# Patient Record
Sex: MALE | Race: BLACK OR AFRICAN AMERICAN | NOT HISPANIC OR LATINO | Employment: FULL TIME | URBAN - METROPOLITAN AREA
[De-identification: names, ages, dates, MRNs, and addresses within clinical notes are randomized per-mention and may not be internally consistent; named-entity substitution may affect disease eponyms.]

---

## 2017-01-17 ENCOUNTER — TELEPHONE (OUTPATIENT)
Dept: FAMILY MEDICINE | Facility: CLINIC | Age: 37
End: 2017-01-17

## 2017-01-17 NOTE — TELEPHONE ENCOUNTER
Alerted by pharmacy  that the medication Wellbutrin XL is not covered.  Reason:  not on formulary   PA phone number is [phone] and the pt ID# is [id number].  Have we done a PA for this med in previous years? YES - Date:   3-7-16 for SR Brand  7-7-14 for Generic XL  5-7-14 for Generic regular formulation    Insurance faxing form.ELVIA Shields, CMA

## 2017-01-18 DIAGNOSIS — M54.50 CHRONIC MIDLINE LOW BACK PAIN WITHOUT SCIATICA: ICD-10-CM

## 2017-01-18 DIAGNOSIS — G89.29 CHRONIC MIDLINE LOW BACK PAIN WITHOUT SCIATICA: ICD-10-CM

## 2017-01-19 PROBLEM — M54.50 CHRONIC MIDLINE LOW BACK PAIN WITHOUT SCIATICA: Status: ACTIVE | Noted: 2017-01-19

## 2017-01-19 PROBLEM — G89.29 CHRONIC MIDLINE LOW BACK PAIN WITHOUT SCIATICA: Status: ACTIVE | Noted: 2017-01-19

## 2017-01-19 RX ORDER — CYCLOBENZAPRINE HCL 10 MG
TABLET ORAL
Qty: 60 TABLET | Refills: 3 | Status: SHIPPED | OUTPATIENT
Start: 2017-01-19 | End: 2022-08-23

## 2017-01-19 NOTE — TELEPHONE ENCOUNTER
Routing refill request to provider for review/approval because:  Drug not on the [facility] refill protocol   Kenya MALLOY RN    Pending Prescriptions:                       Disp   Refills    cyclobenzaprine (FLEXERIL) 10 MG tablet [P*30 tab*0        Sig: TAKE ONE TABLET BY MOUTH NIGHTLY AS NEEDED FOR MUSCLE           SPASMS        Last Written Prescription Date:  1/11/2016  Last Fill Quantity: 30,   # refills: 5  Last Office Visit with [facility] or [facility] prescribing provider: 12/20/2016  Future Office visit:       Routing refill request to provider for review/approval because:  Drug not on the [facility], [facility] or  [place] refill protocol or controlled substance

## 2017-01-20 ENCOUNTER — TELEPHONE (OUTPATIENT)
Dept: FAMILY MEDICINE | Facility: CLINIC | Age: 37
End: 2017-01-20

## 2017-01-20 NOTE — TELEPHONE ENCOUNTER
"Harris,  [facility] calling requesting a verbal order for needles for pt's testosterone injs.  She says he is requesting needles 25G, 1 and 1/2\", 3ml needles.  I gave verbal for these needles 25G, 1 and 1/2\", 3ml needles #30 R 0 per her request.   If not ok let me know.  Thanks,  Nicole Conte RN    "

## 2017-01-27 ENCOUNTER — PRE VISIT (OUTPATIENT)
Dept: UROLOGY | Facility: CLINIC | Age: 37
End: 2017-01-27

## 2017-01-27 NOTE — TELEPHONE ENCOUNTER
Left message with patient to see if semen analysis will be completed  Prior to seeing Dr Goldsmith for TRUS. Patient has lab visit today for Dr Goldsmith labs. Instructed patient to call clinic to let us know

## 2017-01-30 ENCOUNTER — PRE VISIT (OUTPATIENT)
Dept: UROLOGY | Facility: CLINIC | Age: 37
End: 2017-01-30

## 2017-01-30 NOTE — TELEPHONE ENCOUNTER
Left message with patient to see if semen analysis has been completed and to see if labs will be completed prior to seeing Dr Goldsmith for his upcoming TRUS on 2/3/17. If SA or labs will not be completed then office visit should be rescheduled. Gave andrology number to patient and instructed patient to call clinic. Patient no showed to lab visit on 1/27/17

## 2017-02-03 ENCOUNTER — TELEPHONE (OUTPATIENT)
Dept: FAMILY MEDICINE | Facility: CLINIC | Age: 37
End: 2017-02-03

## 2017-02-03 NOTE — TELEPHONE ENCOUNTER
Try for PA. If there is another injectable testosterone that is covered we could sub that but needs injectable as they are attempting pregnancy and we need to avoid sin contact with her in early pregnancy

## 2017-02-03 NOTE — TELEPHONE ENCOUNTER
Alerted by pharmacy  that the medication Testostrone is not covered.  Reason:  not on formulary   PA phone number is [phone] and the pt ID# is [id number].  Have we done a PA for this med in previous years? NO    Would you like to do prior auth or send alternate medication?  Any additional reasoning or failed meds?

## 2017-02-07 ENCOUNTER — TELEPHONE (OUTPATIENT)
Dept: UROLOGY | Facility: CLINIC | Age: 37
End: 2017-02-07

## 2017-02-15 DIAGNOSIS — E29.1 TESTICULAR HYPOFUNCTION: ICD-10-CM

## 2017-02-15 DIAGNOSIS — E29.1 HYPOGONADISM MALE: ICD-10-CM

## 2017-02-15 RX ORDER — CLOMIPHENE CITRATE 50 MG/1
25 TABLET ORAL DAILY
Qty: 15 TABLET | Refills: 5 | Status: SHIPPED | OUTPATIENT
Start: 2017-02-15 | End: 2022-08-23

## 2017-02-20 DIAGNOSIS — E29.1 HYPOGONADISM MALE: ICD-10-CM

## 2017-02-20 DIAGNOSIS — E29.1 TESTICULAR HYPOFUNCTION: ICD-10-CM

## 2017-02-20 LAB
ABNORMAL SPERM: ABNORMAL MORPHOLOGY
ABSTINENCE DAYS: 4 DAYS (ref 2–7)
ABSTINENCE DAYS: 4 DAYS (ref 2–7)
AGGLUTINATION: ABNORMAL YES/NO
AGGLUTINATION: ABNORMAL YES/NO
ANALYSIS TEMP - CENTIGRADE: 22 CENTIGRADE
ANALYSIS TEMP - CENTIGRADE: 22 CENTIGRADE
CELL FRAGMENTS: ABNORMAL %
CELL FRAGMENTS: ABNORMAL %
COLLECTION METHOD: ABNORMAL
COLLECTION METHOD: ABNORMAL
COLLECTION SITE: ABNORMAL
COLLECTION SITE: ABNORMAL
CONSENT TO RELEASE TO PARTNER: YES
CONSENT TO RELEASE TO PARTNER: YES
HEAD DEFECT: ABNORMAL
IMMATURE SPERM: ABNORMAL %
IMMATURE SPERM: ABNORMAL %
IMMOTILE: 100 %
IMMOTILE: ABNORMAL %
LAB RECEIPT TIME: ABNORMAL
LAB RECEIPT TIME: ABNORMAL
LIQUEFIED: ABNORMAL YES/NO
MIDPIECE DEFECT: ABNORMAL
NON-PROGRESSIVE MOTILITY: 0 %
NON-PROGRESSIVE MOTILITY: ABNORMAL %
NORMAL SPERM: ABNORMAL % NORMAL FORMS (ref 4–?)
PROGRESSIVE MOTILITY: 0 % (ref 32–?)
PROGRESSIVE MOTILITY: ABNORMAL % (ref 32–?)
ROUND CELLS: 0.7 MILLION/ML (ref ?–2)
ROUND CELLS: <0.1 MILLION/ML (ref ?–2)
SPECIMEN CONCENTRATION: 0 MILLION/ML (ref 15–?)
SPECIMEN CONCENTRATION: ABNORMAL MILLION/ML (ref 15–?)
SPECIMEN PH: 6.4 PH (ref 7.2–?)
SPECIMEN PH: 8 PH (ref 7.2–?)
SPECIMEN TYPE: ABNORMAL
SPECIMEN TYPE: ABNORMAL
SPECIMEN VOL UR: 1 ML (ref 1.5–?)
SPECIMEN VOL UR: 3 ML (ref 1.5–?)
TAIL DEFECT: ABNORMAL
TIME OF ANALYSIS: ABNORMAL
TIME OF ANALYSIS: ABNORMAL
TOTAL NUMBER: ABNORMAL MILLION (ref 39–?)
TOTAL NUMBER: ABNORMAL MILLION (ref 39–?)
TOTAL PROGRESSIVE MOTILE: 0 MILLION (ref 15.6–?)
TOTAL PROGRESSIVE MOTILE: ABNORMAL MILLION (ref 15.6–?)
VISCOUS: ABNORMAL YES/NO
VITALITY: ABNORMAL % (ref 58–?)
VITALITY: ABNORMAL % (ref 58–?)
WBC SPECIMEN: ABNORMAL %
WBC SPECIMEN: ABNORMAL %

## 2017-02-20 PROCEDURE — 89331 RETROGRADE EJACULATION ANAL: CPT

## 2017-02-20 PROCEDURE — 89320 SEMEN ANAL VOL/COUNT/MOT: CPT

## 2017-02-20 NOTE — LETTER
February 27, 2017       TO: Emmanuel Gardner  [address]  [place] [address]       Dear ,    We are writing to inform you of your test results.    Test results indicate you may require additional follow up, see comment below.  Small amount of sperm production now detectable.   I recommend follow-up Clomid labs and   Repeat semen analysis in 2 months.     Resulted Orders   Retrograde Semen Analysis (JER)   Result Value Ref Range    Collection Method Urination     Collection Site JER     Specimen Type Urine     Lab Receipt Time 1:15 PM     Time of Analysis 1:35 PM     Analysis Temp - Centigrade 22 centigrade    Abstinence days 4 2 - 7 days    Agglutination NA yes/no    pH 6.4 (A) 7.2 pH    Volume 1.0 (A) 1.5 ml    Concentration 0 (A) 15 million/ml      Comment:      No sperm seen in a 10ul aliquot taken from specimen concentrated after centrifugation.    Total Number NA 39 million    Progressive motility NA 32 %    Non-progressive motility NA %    Immotile NA %    Total Progressive Motile NA 15.6 million    Vitality NA 58 %    Normal Sperm NA 4 % normal forms    Abnormal Sperm NA morphology    Head Defect NA     Midpiece Defect NA     Tail Defect NA     Round Cells <0.1 2 million/ml    WBC NA %    Immature Sperm NA %    Cell Fragments NA %    Consent to Release to Partner YES     Narrative    Semen Analysis Reference Range: World Health Organization (WHO) Manual on Semen Analysis, 2010.    (A) signifies reported value below the WHO lower reference limit (5th centile) for semen characteristics.      Semen Analysis, No Morphology (JER)   Result Value Ref Range    Collection Method Masturbation     Collection Site JER     Specimen Type Semen     Lab Receipt Time 1:11 PM     Time of Analysis 1:25 PM     Analysis Temp - Centigrade 22 centigrade    Abstinence days 4 2 - 7 days    Liquefied Y yes/no    Viscous N yes/no    Agglutination NA yes/no    pH 8.0 7.2 pH    Volume 3.0 1.5 ml    Concentration NA 15  million/ml    Total Number 300SPERM 39 million      Comment:      Total sperm number estimated from a 10uL aliquot taken from specimen concentrated after centrifugation.    Progressive motility 0 (A) 32 %    Non-progressive motility 0 %    Immotile 100 %    Total Progressive Motile 0 (A) 15.6 million    Vitality NA 58 %    Round Cells 0.7 2 million/ml    WBC NA %    Immature Sperm NA %    Cell Fragments NA %    Consent to Release to Partner YES     Narrative    Red blood cells present.  Macroscopic gelatinous particles present.    Semen Analysis Reference Range: World Health Organization (WHO) Manual on Semen Analysis, 2010.    (A) signifies reported value below the WHO lower reference limit (5th centile) for semen characteristics.          Henry Goldsmith MD

## 2017-02-21 NOTE — PROGRESS NOTES
Please notify pt of these results.  Small amount of sperm production now detectable.  I recommend follow-up Clomid labs as in epic.   Repeat semen analysis in 2 months.  Thank You  CLARK    - thanks.  CLARK

## 2017-02-28 ENCOUNTER — OFFICE VISIT (OUTPATIENT)
Dept: FAMILY MEDICINE | Facility: CLINIC | Age: 37
End: 2017-02-28
Payer: COMMERCIAL

## 2017-02-28 VITALS
WEIGHT: 170 LBS | RESPIRATION RATE: 16 BRPM | BODY MASS INDEX: 25.76 KG/M2 | OXYGEN SATURATION: 99 % | TEMPERATURE: 98.1 F | DIASTOLIC BLOOD PRESSURE: 80 MMHG | HEIGHT: 68 IN | HEART RATE: 91 BPM | SYSTOLIC BLOOD PRESSURE: 120 MMHG

## 2017-02-28 DIAGNOSIS — E29.1 TESTICULAR HYPOFUNCTION: ICD-10-CM

## 2017-02-28 DIAGNOSIS — M25.50 PAIN IN JOINT, MULTIPLE SITES: ICD-10-CM

## 2017-02-28 PROCEDURE — 99214 OFFICE O/P EST MOD 30 MIN: CPT | Performed by: FAMILY MEDICINE

## 2017-02-28 RX ORDER — TESTOSTERONE CYPIONATE 200 MG/ML
INJECTION, SOLUTION INTRAMUSCULAR
Qty: 30 ML | Refills: 5 | Status: SHIPPED | OUTPATIENT
Start: 2017-02-28 | End: 2018-09-14

## 2017-02-28 RX ORDER — ACETAMINOPHEN AND CODEINE PHOSPHATE 300; 60 MG/1; MG/1
1 TABLET ORAL DAILY PRN
Qty: 30 TABLET | Refills: 0 | Status: SHIPPED | OUTPATIENT
Start: 2017-02-28 | End: 2018-09-14

## 2017-02-28 NOTE — NURSING NOTE
"Chief Complaint   Patient presents with     RECHECK     initial /80 (BP Location: Right arm, Cuff Size: Adult Regular)  Pulse 91  Temp 98.1  F (36.7  C) (Oral)  Resp 16  Ht 5' 8\" (1.727 m)  Wt 170 lb (77.1 kg)  SpO2 99%  BMI 25.85 kg/m2 Estimated body mass index is 25.85 kg/(m^2) as calculated from the following:    Height as of this encounter: 5' 8\" (1.727 m).    Weight as of this encounter: 170 lb (77.1 kg).  BP completed using cuff size: regular.   R arm      Health Maintenance that is potentially due pending provider review:  NONE    n/a    Reji King ma  "

## 2017-02-28 NOTE — MR AVS SNAPSHOT
"              After Visit Summary   2/28/2017    Emmanuel Gardner    MRN: [id number]           Patient Information     Date Of Birth          1980        Visit Information        Provider Department      2/28/2017 4:30 PM Alonso Meadows MD [facility]        Today's Diagnoses     Testicular hypofunction        Pain in joint, multiple sites           Follow-ups after your visit        Who to contact     If you have questions or need follow up information about today's clinic visit or your schedule please contact [facility] directly at [phone].  Normal or non-critical lab and imaging results will be communicated to you by [employer], letter or phone within 4 business days after the clinic has received the results. If you do not hear from us within 7 days, please contact the clinic through [employer] or phone. If you have a critical or abnormal lab result, we will notify you by phone as soon as possible.  Submit refill requests through [employer] or call your pharmacy and they will forward the refill request to us. Please allow 3 business days for your refill to be completed.          Additional Information About Your Visit        MyChart Information     [employer] [facility] gives you secure access to your electronic health record. If you see a primary care provider, you can also send messages to your care team and make appointments. If you have questions, please call your primary care clinic.  If you do not have a primary care provider, please call [phone] and they will assist you.        Care EveryWhere ID     This is your Care EveryWhere ID. This could be used by other organizations to access your [facility] medical records  [id number]        Your Vitals Were     Pulse Temperature Respirations Height Pulse Oximetry BMI (Body Mass Index)    91 98.1  F (36.7  C) (Oral) 16 5' 8\" (1.727 m) 99% 25.85 kg/m2       Blood Pressure from Last 3 Encounters:   02/28/17 120/80   12/30/16 119/78   12/20/16 " 120/70    Weight from Last 3 Encounters:   02/28/17 170 lb (77.1 kg)   12/30/16 164 lb (74.4 kg)   12/20/16 164 lb (74.4 kg)              Today, you had the following     No orders found for display         Today's Medication Changes          These changes are accurate as of: 2/28/17  5:52 PM.  If you have any questions, ask your nurse or doctor.               These medicines have changed or have updated prescriptions.        Dose/Directions    * acetaminophen-codeine 300-60 MG per tablet   Commonly known as:  TYLENOL #4   This may have changed:  Another medication with the same name was added. Make sure you understand how and when to take each.   Used for:  Pain in joint, multiple sites   Changed by:  Alonso Meadows MD        Dose:  1 tablet   Take 1 tablet by mouth every 4 hours as needed for moderate pain   Quantity:  30 tablet   Refills:  0       * acetaminophen-codeine 300-60 MG Tabs   Commonly known as:  TYLENOL/codeine #4   This may have changed:  You were already taking a medication with the same name, and this prescription was added. Make sure you understand how and when to take each.   Used for:  Pain in joint, multiple sites   Changed by:  Alonso Meadows MD        Dose:  1 tablet   Take 1 tablet by mouth daily as needed   Quantity:  30 tablet   Refills:  0       * Notice:  This list has 2 medication(s) that are the same as other medications prescribed for you. Read the directions carefully, and ask your doctor or other care provider to review them with you.         Where to get your medicines      Some of these will need a paper prescription and others can be bought over the counter.  Ask your nurse if you have questions.     Bring a paper prescription for each of these medications     acetaminophen-codeine 300-60 MG per tablet    acetaminophen-codeine 300-60 MG Tabs    testosterone cypionate 200 MG/ML injection                Primary Care Provider Office Phone # Fax #    Alonso Meadows MD  "[phone]       [facility] [address]  [address] [place] [address]        Thank you!     Thank you for choosing [facility]  for your care. Our goal is always to provide you with excellent care. Hearing back from our patients is one way we can continue to improve our services. Please take a few minutes to complete the written survey that you may receive in the mail after your visit with us. Thank you!             Your Updated Medication List - Protect others around you: Learn how to safely use, store and throw away your medicines at www.disposemymeds.org.          This list is accurate as of: 2/28/17  5:52 PM.  Always use your most recent med list.                   Brand Name Dispense Instructions for use    * acetaminophen-codeine 300-60 MG per tablet    TYLENOL #4    30 tablet    Take 1 tablet by mouth every 4 hours as needed for moderate pain       * acetaminophen-codeine 300-60 MG Tabs    TYLENOL/codeine #4    30 tablet    Take 1 tablet by mouth daily as needed       B-12 1000 MCG/ML Kit     4 kit    Inject 1,000 Units as directed once a week       buPROPion 300 MG 24 hr tablet    WELLBUTRIN XL    90 tablet    Take 1 tablet (300 mg) by mouth every morning       cholecalciferol 73917 UNITS capsule    VITAMIN D3    12 capsule    Take 1 capsule (50,000 Units) by mouth once a week       clomiPHENE 50 MG tablet    CLOMID    15 tablet    Take 0.5 tablets (25 mg) by mouth daily       cyclobenzaprine 10 MG tablet    FLEXERIL    60 tablet    TAKE ONE TABLET BY MOUTH NIGHTLY AS NEEDED FOR MUSCLE SPASMS       ibuprofen 600 MG tablet    ADVIL/MOTRIN    100 tablet    TAKE 1 TABLET BY MOUTH EVERY 6 HOURS AS NEEDED FOR MODERATE PAIN       isometheptene-dichloralphenazone-acetaminophen -325 MG per capsule    MIDRIN    100 capsule    Take 1 capsule by mouth every 4 hours as needed for headaches       SAFETY-BRETT SYRINGE 22G X 1-1/2\" 3 ML Misc   Generic drug:  syringe/needle " (disp)          sildenafil 20 MG tablet    REVATIO/VIAGRA    30 tablet    Take 1-2 tablets (20-40 mg) by mouth daily as needed May increase up to 100mg dose ( 5 tablets) if needed.  Use lowest effective dose.       tadalafil 5 MG tablet    CIALIS    30 tablet    Take 1 tablet (5 mg) by mouth as needed for erectile dysfunction       testosterone cypionate 200 MG/ML injection    DEPOTESTOTERONE CYPIONATE    30 mL    3 mls q 10days       traZODone 50 MG tablet    DESYREL    90 tablet    1-3 hs prn sleep       zolpidem 10 MG tablet    AMBIEN    30 tablet    1/2 pill hs prn sleep       * Notice:  This list has 2 medication(s) that are the same as other medications prescribed for you. Read the directions carefully, and ask your doctor or other care provider to review them with you.

## 2017-02-28 NOTE — PROGRESS NOTES
Subjective: See notes from urology. They are in the process of trying to improve his sperm count. For some reason the insurance isn't covering the cost of Clomid but it's not terribly expensive. His sperm count will have to be a lot higher probably before they can conceive another child. He is having a lot of headaches still, and at times has needed Tylenol No. 4, got a prescription many months ago and would like a refill again especially because he will be going back to [place] to  her 10-year-old son who was left over there last year and will hopefully be coming back with him. Unclear exactly when that will happen. I wrote a  Description for 3 months and a refill he can get in 3 months since he is anticipating more trouble with headaches which is what happened last time he went to [facility]. He is scheduled to do multiple lab tests for monitoring of the testosterone replacement and they are already ordered by urology so I will not do that. He will be seeing urology in the near future.    Objective: Normal thought processes and range of affect. Otherwise not examined.    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 Hopefully the Clomid will raise his sperm count up enough that they can get pregnant but it's uncertain. He'll get the lab tests with them. I refilled the Tylenol No. 4 with a refill in 3 months.    Over 25 min was spent with pt, and over half was in counseling

## 2017-03-01 ASSESSMENT — PATIENT HEALTH QUESTIONNAIRE - PHQ9: SUM OF ALL RESPONSES TO PHQ QUESTIONS 1-9: 12

## 2017-03-17 NOTE — TELEPHONE ENCOUNTER
[facility] Prior Authorization Team   Phone: [phone]  Fax: [phone]      PA Initiation    Medication: clomiPHENE (CLOMID) 50 MG tablet  Insurance Company: [employer] - Phone [phone] Fax [phone]  Pharmacy Filling the Rx: [facility] [address] [place], [place] - [address]  Filling Pharmacy Phone: [phone]  Filling Pharmacy Fax: [phone]  Start Date: 3/17/2017

## 2017-03-20 NOTE — TELEPHONE ENCOUNTER
PRIOR AUTHORIZATION NOT AVAILABLE  Medication: clomiPHENE (CLOMID) 50 MG tablet- NO ACTIVE COVERAGE  Termination Date: 2/28/2017    Patient insurance has lapse for the month of March. [employer]/Medicaid termed on 2/28/17. I spoke to Mauri @ Medicaid and he said his MA will be back active on 4/1/17.

## 2017-04-04 DIAGNOSIS — E29.1 TESTICULAR HYPOFUNCTION: ICD-10-CM

## 2017-04-05 RX ORDER — TESTOSTERONE CYPIONATE 200 MG/ML
INJECTION, SOLUTION INTRAMUSCULAR
Start: 2017-04-05

## 2017-04-05 NOTE — TELEPHONE ENCOUNTER
Testosterone       Last Written Prescription Date:  02/28/2017  Last Fill Quantity: 30ml,   # refills: 5  Last Office Visit with [facility] prescribing provider: 02/28/2017  Future Office visit:       Routing refill request to provider for review/approval because:  Drug not on the [facility], [facility] or [facility] refill protocol or controlled substance

## 2017-04-13 DIAGNOSIS — G44.209 MUSCLE CONTRACTION HEADACHE: ICD-10-CM

## 2017-04-13 NOTE — TELEPHONE ENCOUNTER
Pending Prescriptions:                       Disp   Refills    isometheptene-dichloralphenazone-acetamin*100 ca*0            Sig: TAKE ONE CAPSULE BY MOUTH EVERY 4 HOURS AS NEEDED           FOR HEADACHE          Last Written Prescription Date:  12/20/2016  Last Fill Quantity: 100,   # refills: 1  Last Office Visit with [facility], [facility] or  [facility] prescribing provider: 2/28/2017 with Dr. Meadows  Future Office visit:  No future appts scheduled.     Routing refill request to provider for review/approval because:  Drug not on the [facility], [facility] or  [facility] refill protocol or controlled substance    Patt Escobar MA  [facility]

## 2017-05-02 ENCOUNTER — TELEPHONE (OUTPATIENT)
Dept: URGENT CARE | Facility: URGENT CARE | Age: 37
End: 2017-05-02

## 2017-05-02 DIAGNOSIS — F51.02 TRANSIENT INSOMNIA: ICD-10-CM

## 2017-05-02 DIAGNOSIS — K21.9 GASTROESOPHAGEAL REFLUX DISEASE WITHOUT ESOPHAGITIS: Primary | ICD-10-CM

## 2017-05-02 RX ORDER — ZOLPIDEM TARTRATE 10 MG/1
TABLET ORAL
Qty: 30 TABLET | Refills: 1 | Status: SHIPPED | OUTPATIENT
Start: 2017-05-02 | End: 2018-09-14

## 2017-05-03 DIAGNOSIS — N46.11 OLIGOSPERMIA: Primary | ICD-10-CM

## 2017-05-03 DIAGNOSIS — E29.1 HYPOGONADISM, MALE: ICD-10-CM

## 2017-05-03 RX ORDER — ANASTROZOLE 1 MG/1
1 TABLET ORAL DAILY
Qty: 90 TABLET | Refills: 2 | Status: SHIPPED | OUTPATIENT
Start: 2017-05-03 | End: 2018-09-14

## 2017-06-01 DIAGNOSIS — E29.1 TESTICULAR HYPOFUNCTION: Primary | ICD-10-CM

## 2017-06-01 NOTE — NURSING NOTE
Patient notified of one month lab protocol for arimidex   Verbalized understanding    Maida Beauchapm, RN   Care Coordinator Urology

## 2017-06-19 ENCOUNTER — TELEPHONE (OUTPATIENT)
Dept: UROLOGY | Facility: CLINIC | Age: 37
End: 2017-06-19

## 2017-06-19 NOTE — TELEPHONE ENCOUNTER
----- Message from Maida Beauchamp RN sent at 6/1/2017  2:13 PM CDT -----  Regarding: FW: Medication change request  Contact: [phone]  CHECK LABS  ----- Message -----     From: Maida Beauchamp RN     Sent: 6/1/2017       To: Maida Beauchamp RN  Subject: FW: Medication change request                        ----- Message -----     From: Maida Beauchamp RN     Sent: 5/22/2017       To: Maida Beauchamp RN  Subject: FW: Medication change request                        ----- Message -----     From: Henry Goldsmith MD     Sent: 5/3/2017   2:49 PM       To: Danitza Moulton LPN, Maida Beauchamp RN  Subject: RE: Medication change request                    Hi Daintza / Amadou,    I put in the Arimidex order for him.  He can cancel the Clomid    Will need follow-up labs per arimidex protocol    Thank You  CLARK      ----- Message -----     From: Danitza Moulton LPN     Sent: 5/3/2017  11:08 AM       To: Henry Goldsmith MD  Subject: FW: Medication change request                        ----- Message -----     From: Stephanie Solano     Sent: 5/3/2017  10:50 AM       To: Urology & [facility] Triage-  Subject: Medication change request                        Pts pharmacist called to speak with Dr. Jossy oseguera about changing a prescription due to insurance purposes. Pt was prescribed Clomid but they are wanting to switch it to Anastraole because they are not coving the price of Clomid. Please call back pharmacy at [phone] to discuss. Thanks.      MB      Please DO NOT send this message and/or reply back to sender.  Call Center Representatives DO NOT respond to messages.

## 2017-06-19 NOTE — TELEPHONE ENCOUNTER
Patient no showed the scheduled lab appointment     Maida Beauchamp, RN   Care Coordinator Urology

## 2017-11-01 DIAGNOSIS — E29.1 TESTICULAR HYPOFUNCTION: ICD-10-CM

## 2017-11-01 DIAGNOSIS — F51.02 TRANSIENT INSOMNIA: ICD-10-CM

## 2017-11-01 NOTE — TELEPHONE ENCOUNTER
Pending Prescriptions:                       Disp   Refills    testosterone cypionate (DEPOTESTOTERONE) *30 mL  0            Sig: 3 ML EVERY 10 DAYS    zolpidem (AMBIEN) 10 MG tablet [Pharmacy *30 tab*0            Sig: TAKE ONE-HALF TABLET BY MOUTH AT BEDTIME AS           NEEDED FOR SLEEP          Last Written Prescription Date:  02/28/2017 for testosterone, 05/02/2017-zolpidem  Last Fill Quantity: 30ml/30 tabs,   # refills: 5/2  Future Office visit:       Routing refill request to provider for review/approval because:  Drug not on the [facility], [facility] or [facility] refill protocol or controlled substance

## 2017-11-02 RX ORDER — TESTOSTERONE CYPIONATE 200 MG/ML
INJECTION, SOLUTION INTRAMUSCULAR
Start: 2017-11-02

## 2017-11-02 RX ORDER — ZOLPIDEM TARTRATE 10 MG/1
TABLET ORAL
Start: 2017-11-02

## 2017-11-02 NOTE — TELEPHONE ENCOUNTER
Former [facility] patient  Needs appt prior to below controlled substance refills  Patient informed - states he will callback to schedule  Denied refill requests to pharmacy  Kenya MALLOY RN

## 2018-06-29 DIAGNOSIS — M54.50 CHRONIC MIDLINE LOW BACK PAIN WITHOUT SCIATICA: ICD-10-CM

## 2018-06-29 DIAGNOSIS — G89.29 CHRONIC MIDLINE LOW BACK PAIN WITHOUT SCIATICA: ICD-10-CM

## 2018-06-29 RX ORDER — CYCLOBENZAPRINE HCL 10 MG
TABLET ORAL
Start: 2018-06-29

## 2018-06-29 NOTE — TELEPHONE ENCOUNTER
Pending Prescriptions:                       Disp   Refills    cyclobenzaprine (FLEXERIL) 10 MG tablet [*60 tab*0            Sig: TAKE 1 TABLET BY MOUTH EVERY NIGHT AS NEEDED FOR           MUSCLE SPASMS          Last Written Prescription Date:  01/19/2017  Last Fill Quantity: 60,   # refills: 3  Last Office Visit: 02/28/2017  Future Office visit:       Routing refill request to provider for review/approval because:  Drug not on the [facility] or [facility] refill protocol or controlled substance

## 2018-06-29 NOTE — TELEPHONE ENCOUNTER
Former patient of [facility].  Last seen 2/28/17  Needs to establish care with new provider.  Pharmacy informed  Maryellen Jimenez RN

## 2018-09-14 ENCOUNTER — OFFICE VISIT (OUTPATIENT)
Dept: PEDIATRICS | Facility: CLINIC | Age: 38
End: 2018-09-14
Payer: COMMERCIAL

## 2018-09-14 VITALS
HEIGHT: 68 IN | DIASTOLIC BLOOD PRESSURE: 64 MMHG | SYSTOLIC BLOOD PRESSURE: 104 MMHG | WEIGHT: 166 LBS | BODY MASS INDEX: 25.16 KG/M2 | HEART RATE: 98 BPM | TEMPERATURE: 98.4 F | OXYGEN SATURATION: 95 %

## 2018-09-14 DIAGNOSIS — Z23 NEED FOR PROPHYLACTIC VACCINATION AND INOCULATION AGAINST INFLUENZA: ICD-10-CM

## 2018-09-14 DIAGNOSIS — E29.1 HYPOGONADISM MALE: Primary | ICD-10-CM

## 2018-09-14 DIAGNOSIS — E55.9 VITAMIN D DEFICIENCY: ICD-10-CM

## 2018-09-14 LAB
ALBUMIN SERPL-MCNC: 3.8 G/DL (ref 3.4–5)
ALP SERPL-CCNC: 39 U/L (ref 40–150)
ALT SERPL W P-5'-P-CCNC: 49 U/L (ref 0–70)
ANION GAP SERPL CALCULATED.3IONS-SCNC: 9 MMOL/L (ref 3–14)
AST SERPL W P-5'-P-CCNC: 26 U/L (ref 0–45)
BILIRUB SERPL-MCNC: 0.5 MG/DL (ref 0.2–1.3)
BUN SERPL-MCNC: 18 MG/DL (ref 7–30)
CALCIUM SERPL-MCNC: 8.6 MG/DL (ref 8.5–10.1)
CHLORIDE SERPL-SCNC: 108 MMOL/L (ref 94–109)
CO2 SERPL-SCNC: 23 MMOL/L (ref 20–32)
CREAT SERPL-MCNC: 0.95 MG/DL (ref 0.66–1.25)
DEPRECATED CALCIDIOL+CALCIFEROL SERPL-MC: 28 UG/L (ref 20–75)
GFR SERPL CREATININE-BSD FRML MDRD: 88 ML/MIN/1.7M2
GLUCOSE SERPL-MCNC: 88 MG/DL (ref 70–99)
HGB BLD-MCNC: 14.9 G/DL (ref 13.3–17.7)
POTASSIUM SERPL-SCNC: 3.8 MMOL/L (ref 3.4–5.3)
PROT SERPL-MCNC: 7.1 G/DL (ref 6.8–8.8)
SODIUM SERPL-SCNC: 140 MMOL/L (ref 133–144)

## 2018-09-14 PROCEDURE — 90686 IIV4 VACC NO PRSV 0.5 ML IM: CPT | Performed by: INTERNAL MEDICINE

## 2018-09-14 PROCEDURE — 82306 VITAMIN D 25 HYDROXY: CPT | Performed by: INTERNAL MEDICINE

## 2018-09-14 PROCEDURE — 90471 IMMUNIZATION ADMIN: CPT | Performed by: INTERNAL MEDICINE

## 2018-09-14 PROCEDURE — 85018 HEMOGLOBIN: CPT | Performed by: INTERNAL MEDICINE

## 2018-09-14 PROCEDURE — 84403 ASSAY OF TOTAL TESTOSTERONE: CPT | Performed by: INTERNAL MEDICINE

## 2018-09-14 PROCEDURE — 99214 OFFICE O/P EST MOD 30 MIN: CPT | Mod: 25 | Performed by: INTERNAL MEDICINE

## 2018-09-14 PROCEDURE — 80053 COMPREHEN METABOLIC PANEL: CPT | Performed by: INTERNAL MEDICINE

## 2018-09-14 PROCEDURE — 36415 COLL VENOUS BLD VENIPUNCTURE: CPT | Performed by: INTERNAL MEDICINE

## 2018-09-14 RX ORDER — TESTOSTERONE CYPIONATE 200 MG/ML
INJECTION, SOLUTION INTRAMUSCULAR
Qty: 30 ML | Refills: 0 | Status: SHIPPED | OUTPATIENT
Start: 2018-09-14 | End: 2018-12-10

## 2018-09-14 ASSESSMENT — ANXIETY QUESTIONNAIRES
1. FEELING NERVOUS, ANXIOUS, OR ON EDGE: SEVERAL DAYS
3. WORRYING TOO MUCH ABOUT DIFFERENT THINGS: SEVERAL DAYS
GAD7 TOTAL SCORE: 6
5. BEING SO RESTLESS THAT IT IS HARD TO SIT STILL: NOT AT ALL
2. NOT BEING ABLE TO STOP OR CONTROL WORRYING: SEVERAL DAYS
IF YOU CHECKED OFF ANY PROBLEMS ON THIS QUESTIONNAIRE, HOW DIFFICULT HAVE THESE PROBLEMS MADE IT FOR YOU TO DO YOUR WORK, TAKE CARE OF THINGS AT HOME, OR GET ALONG WITH OTHER PEOPLE: SOMEWHAT DIFFICULT
6. BECOMING EASILY ANNOYED OR IRRITABLE: SEVERAL DAYS
7. FEELING AFRAID AS IF SOMETHING AWFUL MIGHT HAPPEN: SEVERAL DAYS

## 2018-09-14 ASSESSMENT — PATIENT HEALTH QUESTIONNAIRE - PHQ9: 5. POOR APPETITE OR OVEREATING: SEVERAL DAYS

## 2018-09-14 NOTE — PATIENT INSTRUCTIONS
Check labwork today   I will contact you with the results    Restart testosterone    Recheck labs in 2 to 2 1/2 months   See me a few days after the labs are drawn

## 2018-09-14 NOTE — LETTER
My Depression Action Plan  Name: Emmanuel Gardner   Date of Birth 1980  Date: 9/14/2018    My doctor: Chris Connelly   My clinic: [address]  [address]  [facility] [address] [phone]          GREEN    ZONE   Good Control    What it looks like:     Things are going generally well. You have normal up s and down s. You may even feel depressed from time to time, but bad moods usually last less than a day.   What you need to do:  1. Continue to care for yourself (see self care plan)  2. Check your depression survival kit and update it as needed  3. Follow your physician s recommendations including any medication.  4. Do not stop taking medication unless you consult with your physician first.           YELLOW         ZONE Getting Worse    What it looks like:     Depression is starting to interfere with your life.     It may be hard to get out of bed; you may be starting to isolate yourself from others.    Symptoms of depression are starting to last most all day and this has happened for several days.     You may have suicidal thoughts but they are not constant.   What you need to do:     1. Call your care team, your response to treatment will improve if you keep your care team informed of your progress. Yellow periods are signs an adjustment may need to be made.     2. Continue your self-care, even if you have to fake it!    3. Talk to someone in your support network    4. Open up your depression survival kit           RED    ZONE Medical Alert - Get Help    What it looks like:     Depression is seriously interfering with your life.     You may experience these or other symptoms: You can t get out of bed most days, can t work or engage in other necessary activities, you have trouble taking care of basic hygiene, or basic responsibilities, thoughts of suicide or death that will not go away, self-injurious behavior.     What you need to do:  1. Call  your care team and request a same-day appointment. If they are not available (weekends or after hours) call your local crisis line, emergency room or 911.            Depression Self Care Plan / Survival Kit    Self-Care for Depression  Here s the deal. Your body and mind are really not as separate as most people think.  What you do and think affects how you feel and how you feel influences what you do and think. This means if you do things that people who feel good do, it will help you feel better.  Sometimes this is all it takes.  There is also a place for medication and therapy depending on how severe your depression is, so be sure to consult with your medical provider and/ or Behavioral Health Consultant if your symptoms are worsening or not improving.     In order to better manage my stress, I will:    Exercise  Get some form of exercise, every day. This will help reduce pain and release endorphins, the  feel good  chemicals in your brain. This is almost as good as taking antidepressants!  This is not the same as joining a gym and then never going! (they count on that by the way ) It can be as simple as just going for a walk or doing some gardening, anything that will get you moving.      Hygiene   Maintain good hygiene (Get out of bed in the morning, Make your bed, Brush your teeth, Take a shower, and Get dressed like you were going to work, even if you are unemployed).  If your clothes don't fit try to get ones that do.    Diet  I will strive to eat foods that are good for me, drink plenty of water, and avoid excessive sugar, caffeine, alcohol, and other mood-altering substances.  Some foods that are helpful in depression are: complex carbohydrates, B vitamins, flaxseed, fish or fish oil, fresh fruits and vegetables.    Psychotherapy  I agree to participate in Individual Therapy (if recommended).    Medication  If prescribed medications, I agree to take them.  Missing doses can result in serious side effects.   I understand that drinking alcohol, or other illicit drug use, may cause potential side effects.  I will not stop my medication abruptly without first discussing it with my provider.    Staying Connected With Others  I will stay in touch with my friends, family members, and my primary care provider/team.    Use your imagination  Be creative.  We all have a creative side; it doesn t matter if it s oil painting, sand castles, or mud pies! This will also kick up the endorphins.    Witness Beauty  (AKA stop and smell the roses) Take a look outside, even in mid-winter. Notice colors, textures. Watch the squirrels and birds.     Service to others  Be of service to others.  There is always someone else in need.  By helping others we can  get out of ourselves  and remember the really important things.  This also provides opportunities for practicing all the other parts of the program.    Humor  Laugh and be silly!  Adjust your TV habits for less news and crime-drama and more comedy.    Control your stress  Try breathing deep, massage therapy, biofeedback, and meditation. Find time to relax each day.     My support system    Clinic Contact:  Phone number:    Contact 1:  Phone number:    Contact 2:  Phone number:    Rastafarian/:  Phone number:    Therapist:  Phone number:    [facility] crisis center:    Phone number:    Other community support:  Phone number:

## 2018-09-14 NOTE — MR AVS SNAPSHOT
After Visit Summary   9/14/2018    Emmanuel Gardner    MRN: [id number]           Patient Information     Date Of Birth          1980        Visit Information        Provider Department      9/14/2018 8:30 AM Meek Mckay MD [facility]        Today's Diagnoses     Hypogonadism male    -  1    Vitamin D deficiency          Care Instructions    Check labwork today   I will contact you with the results    Restart testosterone    Recheck labs in 2 to 2 1/2 months   See me a few days after the labs are drawn            Follow-ups after your visit        Your next 10 appointments already scheduled     Sep 19, 2018  9:20 AM CDT   Office Visit with Chris Connelly MD   [facility])    [address]  [address]  [facility] [address] [phone]           Bring a current list of meds and any records pertaining to this visit. For Physicals, please bring immunization records and any forms needing to be filled out. Please arrive 10 minutes early to complete paperwork.              Future tests that were ordered for you today     Open Future Orders        Priority Expected Expires Ordered    Testosterone, total Routine  9/14/2019 9/14/2018    Hemoglobin Routine  9/14/2019 9/14/2018    ALT Routine  9/14/2019 9/14/2018            Who to contact     If you have questions or need follow up information about today's clinic visit or your schedule please contact [facility] directly at [phone].  Normal or non-critical lab and imaging results will be communicated to you by MyChart, letter or phone within 4 business days after the clinic has received the results. If you do not hear from us within 7 days, please contact the clinic through MyChart or phone. If you have a critical or abnormal lab result, we will notify you by phone as soon as possible.  Submit refill requests through [employer] or call your pharmacy and they will forward  "the refill request to us. Please allow 3 business days for your refill to be completed.          Additional Information About Your Visit        [employer] Information     [employer] gives you secure access to your electronic health record. If you see a primary care provider, you can also send messages to your care team and make appointments. If you have questions, please call your primary care clinic.  If you do not have a primary care provider, please call [phone] and they will assist you.        Care EveryWhere ID     This is your Care EveryWhere ID. This could be used by other organizations to access your [place] medical records  [id number]        Your Vitals Were     Pulse Temperature Height Pulse Oximetry BMI (Body Mass Index)       98 98.4  F (36.9  C) (Oral) 5' 7.72\" (1.72 m) 95% 25.45 kg/m2        Blood Pressure from Last 3 Encounters:   09/14/18 104/64   02/28/17 120/80   12/30/16 119/78    Weight from Last 3 Encounters:   09/14/18 166 lb (75.3 kg)   02/28/17 170 lb (77.1 kg)   12/30/16 164 lb (74.4 kg)              We Performed the Following     Comprehensive metabolic panel     DEPRESSION ACTION PLAN (DAP)     Hemoglobin     Testosterone, total     Vitamin D Deficiency          Where to get your medicines      Some of these will need a paper prescription and others can be bought over the counter.  Ask your nurse if you have questions.     Bring a paper prescription for each of these medications     testosterone cypionate 200 MG/ML injection          Primary Care Provider Office Phone # Fax #    Chris Connelly [phone] [address] [place] [address]        Equal Access to Services     [facility]: Hadii mae Patel, waaxda luqadaha, qaybta kaalkanika herr. So [facility] [phone].    ATENCIÓN: Si habla español, tiene a vaz disposición servicios gratuitos de asistencia lingüística. Llame al " "[phone].    We comply with applicable federal civil rights laws and [place] laws. We do not discriminate on the basis of race, color, national origin, age, disability, sex, sexual orientation, or gender identity.            Thank you!     Thank you for choosing [facility]  for your care. Our goal is always to provide you with excellent care. Hearing back from our patients is one way we can continue to improve our services. Please take a few minutes to complete the written survey that you may receive in the mail after your visit with us. Thank you!             Your Updated Medication List - Protect others around you: Learn how to safely use, store and throw away your medicines at www.disposemymeds.org.          This list is accurate as of 9/14/18  9:10 AM.  Always use your most recent med list.                   Brand Name Dispense Instructions for use Diagnosis    cholecalciferol 70534 units capsule    VITAMIN D3    12 capsule    Take 1 capsule (50,000 Units) by mouth once a week    Vitamin D deficiency       clomiPHENE 50 MG tablet    CLOMID    15 tablet    Take 0.5 tablets (25 mg) by mouth daily    Hypogonadism male, Testicular hypofunction       cyclobenzaprine 10 MG tablet    FLEXERIL    60 tablet    TAKE ONE TABLET BY MOUTH NIGHTLY AS NEEDED FOR MUSCLE SPASMS    Chronic midline low back pain without sciatica       ibuprofen 600 MG tablet    ADVIL/MOTRIN    100 tablet    TAKE 1 TABLET BY MOUTH EVERY 6 HOURS AS NEEDED FOR MODERATE PAIN    Midline low back pain without sciatica       ranitidine 300 MG tablet    ZANTAC    90 tablet    Take 1 tablet (300 mg) by mouth At Bedtime    Gastroesophageal reflux disease without esophagitis       SAFETY-BRETT SYRINGE 22G X 1-1/2\" 3 ML Misc   Generic drug:  syringe/needle (disp)           sildenafil 20 MG tablet    REVATIO    30 tablet    Take 1-2 tablets (20-40 mg) by mouth daily as needed May increase up to 100mg dose ( 5 tablets) if needed.  Use lowest " effective dose.    Erectile dysfunction, unspecified erectile dysfunction type, Testicular hypofunction, Hypogonadism male       tadalafil 5 MG tablet    CIALIS    30 tablet    Take 1 tablet (5 mg) by mouth as needed for erectile dysfunction    ED (erectile dysfunction)       testosterone cypionate 200 MG/ML injection    DEPOTESTOTERONE    30 mL    3 mls q 10days    Hypogonadism male       traZODone 50 MG tablet    DESYREL    90 tablet    1-3 hs prn sleep    Transient insomnia

## 2018-09-14 NOTE — PROGRESS NOTES
"  SUBJECTIVE:   Emmanuel Gardner is a 38 year old male who presents to clinic today for the following health issues:      Discuss Restarting Testosterone   New pt to this clinic. [facility] care at [facility].   Hx of hypogonadism.  Has been evaluated by urology in the past.  Treated with testosterone injections.  Has needed a high dose in the past - 600 mg Q 10 days.  Reviewed prior testosterone levels.  Last 2 levels were low, but prior to that had a level of 4086.  Unclear if he was taking testosterone regularly or missing doses.  Has been off for the past several months.  Noting sx of fatigue, decreased concentration. Would like to restart.    Also hx of vitamin D deficiency.   Was treated w/ high dose in the past. Currently using OTC D supplement.    Interested in influenza vaccine today.     Problem list and histories reviewed & adjusted, as indicated.    Labs reviewed in EPIC    Reviewed and updated as needed this visit by Provider  Tobacco  Allergies  Meds  Problems  Med Hx  Surg Hx  Fam Hx  Soc Hx          ROS:  Constitutional, HEENT, cardiovascular, pulmonary, gi and gu systems are negative, except as otherwise noted.    OBJECTIVE:     /64 (BP Location: Right arm, Cuff Size: Adult Regular)  Pulse 98  Temp 98.4  F (36.9  C) (Oral)  Ht 5' 7.72\" (1.72 m)  Wt 166 lb (75.3 kg)  SpO2 95%  BMI 25.45 kg/m2  Body mass index is 25.45 kg/(m^2).  GEN: No distress  HEENT: PERRL. No nasal discharge. OP moist w/o lesions.  NECK: Supple. No LAD, TM.  LUNGS: Clear to auscultation bilaterally. No rhonchi, rales, wheezes or retractions.  CV: Regular rate and rhythm.  No murmurs, rubs or gallops. Pulses 2+ radial.  ABD: BS+. S, ND.  EXTR: no edema  PSYCH: Normal affect. Well groomed. Good eye contact.     ASSESSMENT/PLAN:       ICD-10-CM    1. Hypogonadism male E29.1 Testosterone, total     Comprehensive metabolic panel     Hemoglobin     testosterone cypionate (DEPOTESTOTERONE) 200 MG/ML injection "     Testosterone, total     Hemoglobin     ALT   2. Vitamin D deficiency E55.9 Vitamin D Deficiency   3. Need for prophylactic vaccination and inoculation against influenza Z23 FLU VACCINE, SPLIT VIRUS, IM (QUADRIVALENT) [50731]- >3 YRS      Patient Instructions   Check labwork today   I will contact you with the results    Restart testosterone    Recheck labs in 2 to 2 1/2 months   See me a few days after the labs are drawn    - will need to ensure testosterone level does not go too high with the current replacement dose. Goal level of testosterone should be 500-1000    Meek Mckay MD  [facility]

## 2018-09-15 ASSESSMENT — PATIENT HEALTH QUESTIONNAIRE - PHQ9: SUM OF ALL RESPONSES TO PHQ QUESTIONS 1-9: 9

## 2018-09-15 ASSESSMENT — ANXIETY QUESTIONNAIRES: GAD7 TOTAL SCORE: 6

## 2018-09-19 LAB — TESTOST SERPL-MCNC: 131 NG/DL (ref 240–950)

## 2018-12-10 DIAGNOSIS — E29.1 HYPOGONADISM MALE: ICD-10-CM

## 2018-12-10 RX ORDER — TESTOSTERONE CYPIONATE 200 MG/ML
INJECTION, SOLUTION INTRAMUSCULAR
Qty: 30 ML | Refills: 0 | Status: SHIPPED | OUTPATIENT
Start: 2018-12-10 | End: 2019-02-13

## 2018-12-10 NOTE — TELEPHONE ENCOUNTER
Rx refilled. In my outbasket.    Please call pt - he needs to schedule an OV w/ labwork to recheck his testosterone level prior to additional refills.

## 2018-12-10 NOTE — TELEPHONE ENCOUNTER
The rx has been faxed and received. The pt is aware and he will back at a later date to arrange these appointments.   Raiza Nelson on 12/10/2018 at 2:31 PM

## 2019-02-13 ENCOUNTER — OFFICE VISIT (OUTPATIENT)
Dept: PEDIATRICS | Facility: CLINIC | Age: 39
End: 2019-02-13
Payer: COMMERCIAL

## 2019-02-13 VITALS
HEART RATE: 91 BPM | BODY MASS INDEX: 26.68 KG/M2 | HEIGHT: 68 IN | TEMPERATURE: 98.2 F | OXYGEN SATURATION: 98 % | DIASTOLIC BLOOD PRESSURE: 60 MMHG | SYSTOLIC BLOOD PRESSURE: 116 MMHG | WEIGHT: 176.06 LBS

## 2019-02-13 DIAGNOSIS — F51.02 TRANSIENT INSOMNIA: ICD-10-CM

## 2019-02-13 DIAGNOSIS — E29.1 HYPOGONADISM MALE: ICD-10-CM

## 2019-02-13 DIAGNOSIS — G56.03 BILATERAL CARPAL TUNNEL SYNDROME: Primary | ICD-10-CM

## 2019-02-13 DIAGNOSIS — I10 ESSENTIAL HYPERTENSION: ICD-10-CM

## 2019-02-13 LAB — HGB BLD-MCNC: 16.5 G/DL (ref 13.3–17.7)

## 2019-02-13 PROCEDURE — 84460 ALANINE AMINO (ALT) (SGPT): CPT | Performed by: INTERNAL MEDICINE

## 2019-02-13 PROCEDURE — 84403 ASSAY OF TOTAL TESTOSTERONE: CPT | Performed by: INTERNAL MEDICINE

## 2019-02-13 PROCEDURE — 85018 HEMOGLOBIN: CPT | Performed by: INTERNAL MEDICINE

## 2019-02-13 PROCEDURE — 36415 COLL VENOUS BLD VENIPUNCTURE: CPT | Performed by: INTERNAL MEDICINE

## 2019-02-13 PROCEDURE — 99214 OFFICE O/P EST MOD 30 MIN: CPT | Performed by: INTERNAL MEDICINE

## 2019-02-13 RX ORDER — AMLODIPINE BESYLATE 10 MG/1
10 TABLET ORAL DAILY
Qty: 90 TABLET | Refills: 3 | COMMUNITY
Start: 2019-02-13 | End: 2022-08-23

## 2019-02-13 RX ORDER — LISINOPRIL 40 MG/1
TABLET ORAL
Refills: 4 | COMMUNITY
Start: 2019-01-12 | End: 2022-08-23

## 2019-02-13 RX ORDER — MELOXICAM 15 MG/1
15 TABLET ORAL DAILY
Qty: 90 TABLET | Refills: 3 | Status: SHIPPED | OUTPATIENT
Start: 2019-02-13 | End: 2021-04-16

## 2019-02-13 RX ORDER — TESTOSTERONE CYPIONATE 200 MG/ML
INJECTION, SOLUTION INTRAMUSCULAR
Qty: 30 ML | Refills: 0 | Status: SHIPPED | OUTPATIENT
Start: 2019-02-13 | End: 2019-12-09

## 2019-02-13 ASSESSMENT — MIFFLIN-ST. JEOR: SCORE: 1673.67

## 2019-02-13 NOTE — PROGRESS NOTES
"  SUBJECTIVE:   Emmanuel Gardner is a 39 year old male who presents to clinic today for the following health issues:      Musculoskeletal problem/pain      Duration: x2 weeks, mostly at night     Description  Location: right and left hand     Intensity:  moderate    Accompanying signs and symptoms: numbness     History  Previous similar problem: no   Previous evaluation:  none    Precipitating or alleviating factors:  Trauma or overuse: \"unsure if its from sleeping at night or \"   Aggravating factors include: none    Therapies tried and outcome: stretching    Numbness is noted in both hands. Typically from the thumb to the 4th finger. Does not extend far into the forearms. Tends to be worse at nighttime.    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     HTN. Reviewed medications. No cardiac sx such as CP, palpitations, PND, orthopnea, DILLON or peripheral edema. Was treated w/ lisinopril w/ good control. Had an outside provider add amlodipine. Unclear reason to add.   BP Readings from Last 3 Encounters:   02/13/19 116/60   09/14/18 104/64   02/28/17 120/80     Hypogonadism. Using a testosterone supplement.     Insomnia. Having difficulty w/ current med. Discussed options. 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     Problem list and histories reviewed & adjusted, as indicated.    Labs reviewed in EPIC    Reviewed and updated as needed this visit by Provider  Tobacco  Allergies  Meds  Problems  Med Hx  Surg Hx  Fam Hx         ROS:  Constitutional, HEENT, cardiovascular, pulmonary, gi and gu systems are negative, except as otherwise noted.    OBJECTIVE:     /60 (BP Location: Right arm, Patient Position: Chair, Cuff Size: Adult Large)   Pulse 91   Temp 98.2  F (36.8  C) (Tympanic)   Ht 1.72 m (5' 7.72\")   Wt 79.9 kg (176 lb 1 oz)   SpO2 98%   BMI 26.99 kg/m    Body mass index " is 26.99 kg/m .  GEN: no distress  SKIN: no rashes  PSYCH: Normal affect. Well groomed. Good eye contact.  NECK: Supple. No LAD or TM.  LUNGS: Clear to auscultation bilaterally. No rhonchi, rales, wheezes or retractions.  CV: Regular rate and rhythm.  No murmurs, rubs or gallops. Pulses 2+ radial.  EXTR: no edema. No shoulder pain w/ palpation or impingement.   NEURO: normal strength and sensation BUE. Normal gait. Does develop numbness with hands held palmar flexed.       ASSESSMENT/PLAN:     (G56.03) Bilateral carpal tunnel syndrome  (primary encounter diagnosis)  Comment: sx are likely carpal tunnel  Plan: meloxicam (MOBIC) 15 MG tablet, order for DME,         amitriptyline (ELAVIL) 25 MG tablet        Braces given, wear at nighttime at least. Home PT. Call if sx worsen, can refer to hand.     (E29.1) Hypogonadism male  Comment: treated w/ testosterone  Plan: testosterone cypionate (DEPOTESTOSTERONE) 200         MG/ML injection        Check labs today    (I10) Essential hypertension  Comment: BP is controlled  Plan: amLODIPine (NORVASC) 10 MG tablet          (F51.02) Transient insomnia  Comment: not helped by trazodone  Plan: amitriptyline (ELAVIL) 25 MG tablet            Patient Instructions   Meloxicam (Mobic) 15 mg once per day   Anti-inflammatory medication, should help reduce pain    Amitriptyline 25 mg at bedtime as needed   This should help with sleep as well as pain    You can try taking 1/2 lisinopril (20 mg per day) with amlodipine 1/2 - 1 tablet                     Meek Mckay MD  [facility]

## 2019-02-13 NOTE — PATIENT INSTRUCTIONS
Meloxicam (Mobic) 15 mg once per day   Anti-inflammatory medication, should help reduce pain    Amitriptyline 25 mg at bedtime as needed   This should help with sleep as well as pain    You can try taking 1/2 lisinopril (20 mg per day) with amlodipine 1/2 - 1 tablet                   Carpal Tunnel Syndrome               What is carpal tunnel syndrome?   Carpal tunnel syndrome is a common, painful disorder of the wrist and hand.   How does it occur?   Carpal tunnel syndrome is caused by pressure on the median nerve in your wrist. People who use their hands and wrists in the same motion over and over tend to develop carpal tunnel syndrome. It is common in cashiers, , assembly-line workers, and people who work on the computer.   Swelling from a broken bone or other injury can cause pressure on the nerve as well. Diseases such as arthritis, diabetes, or hypothyroidism can cause swelling and pressure in the wrist. Sometimes it happens during pregnancy.   What are the symptoms?   The symptoms include:   pain, numbness, or tingling in your hand and wrist, especially in the thumb and index and middle fingers; pain may radiate up into the forearm   increased pain with increased use of your hand, such as when you are driving or reading the newspaper   increased pain at night   weak  and tendency to drop objects held in the hand   sensitivity to cold   muscle deterioration especially in the thumb (in later stages)   How is it diagnosed?   Your healthcare provider will review your symptoms, examine you, and discuss the ways you use your hands. He or she may also do the following tests:   Your provider may tap the inside middle of your wrist over the median nerve. You may feel pain or a sensation like an electric shock.   You may be asked to bend your wrist down for one minute to see if this causes symptoms.   Your provider may arrange to test the response of your nerves and muscles to electrical stimulation.   How  is it treated?   If you have a disease that is causing carpal tunnel syndrome (such as rheumatoid arthritis), treating the disease may relieve your symptoms.   Other treatment focuses on relieving irritation and pressure on the nerve in your wrist. To relieve pressure your healthcare provider may suggest:   restricting use of your hand or changing the way you use it   changing the position of your desk, computer, and chair to one that irritates your wrist less   wearing a wrist splint   exercises   Your provider may prescribe a steroid medicine or a nonsteroidal anti-inflammatory medicine, such as ibuprofen. Nonsteroidal anti-inflammatory medicines (NSAIDs) may cause stomach bleeding and other problems. These risks increase with age. Read the label and take as directed. Unless recommended by your healthcare provider, do not take for more than 10 days.   Your provider may give you an injection of a corticosteroid medicine.   In some cases surgery may be necessary.   How long will the effects last?   How long the symptoms of carpal tunnel syndrome last depends on the cause and your response to treatment. Sometimes the symptoms go away without any treatment, or they may be relieved by nonsurgical treatment. Surgery may be needed to relieve the symptoms if they do not respond to treatment or they get worse. Surgery usually relieves the symptoms, especially if there is no permanent damage to the nerve.   Symptoms of carpal tunnel syndrome that occur during pregnancy usually disappear following delivery.   How can I take care of myself?   Follow your healthcare provider's recommendations. Also try the following:   Raise your arm on a pillow when you sit or lie down.   Avoid activities that overuse your hand.   When you use a computer mouse, use it with the hand that does not have carpal tunnel syndrome.   Find a different way to use your hand by using another tool or try to use the other hand.   Avoid bending your wrists.    When can I return to my normal activities?   Everyone recovers from an injury at a different rate. Return to your activities depends on how soon your wrist recovers, not by how many days or weeks it has been since your injury has occurred. In general, the longer you have symptoms before you start treatment, the longer it will take to get better. The goal is to return to your normal activities as soon as is safely possible. If you return too soon you may worsen your injury.   You may return to your activities when you are able to painlessly  objects and have full range of motion and strength back in your wrist.   What can I do to help prevent carpal tunnel syndrome?   Make sure that your hands and wrists are supported, especially if you do repetitive work. Take regular breaks from the repetitive motion. Do not rest your wrists on hard or ridged surfaces for long periods of time.   In some cases the cause is not known and carpal tunnel syndrome cannot be prevented.            Carpal Tunnel Rehabilitation Exercise            You may do all of these exercises right away.   Wrist Range of Motion   0. Flexion: Gently bend your wrist forward. Hold for 5 seconds. Do 3 sets of 10.   0. Extension: Gently bend your wrist backward. Hold this position 5 seconds. Do 3 sets of 10.   0. Side to side: Gently move your wrist from side to side (a handshake motion). Hold for 5 seconds in each direction. Do 3 sets of 10.   Wrist stretch: Press the back of the hand on your injured side with your other hand to help bend your wrist. Hold for 15 to 30 seconds. Next, stretch the hand back by pressing the fingers in a backward direction. Hold for 15 to 30 seconds. Keep the arm on your injured side straight during this exercise. Do 3 sets.   Mid-trap exercise: Lie on your stomach on a firm surface and place a folded pillow underneath your chest. Place your arms out straight to your sides with your elbows straight and thumbs toward the  ceiling. Slowly raise your arms toward the ceiling as you squeeze your shoulder blades together. Lower slowly. Do 3 sets of 15. As the exercise gets easier to do, hold soup cans or small weights in your hands.   Pectoralis stretch:  a doorway or corner with both hands slightly above your head on the door frame or wall. Slowly lean forward until you feel a stretch in the front of your shoulders. Hold 15 to 30 seconds. Repeat 3 times.   Scalene stretch: Sit or stand and clasp both hands behind your back. Lower your left shoulder and tilt your head toward the right until you feel a stretch. Hold this position for 15 to 30 seconds and then come back to the starting position. Then lower your right shoulder and tilt your head toward the left. Hold for 15 to 30 seconds. Repeat 3 times on each side.   Thoracic extension: While sitting in a chair, clasp both arms behind your head. Gently arch backward and look up toward the ceiling. Repeat 10 times. Do this several times each day.   Scapular squeeze: While sitting or standing with your arms by your sides, squeeze your shoulder blades together and hold for 5 seconds. Do 3 sets of 10.   Wrist extension: Hold a soup can or hammer handle in your hand with your palm facing down. Slowly bend your wrist up. Slowly lower the weight down into the starting position. Do 3 sets of 10. Gradually increase the weight of the object you are holding.    strengthening: Squeeze a soft rubber ball and hold the squeeze for 5 seconds. Do 3 sets of 10.            Published by [employer].  This content is reviewed periodically and is subject to change as new health information becomes available. The information is intended to inform and educate and is not a replacement for medical evaluation, advice, diagnosis or treatment by a healthcare professional.   Written by Marybeth Bazan, MS, PT, and Jolie Hernandez, PT, [facility]\"", for [employer].   ? 2010 ""[facility] and/or its affiliates. All  Rights Reserved.   Copyright   [employer] 2011

## 2019-02-14 LAB — ALT SERPL W P-5'-P-CCNC: 39 U/L (ref 0–70)

## 2019-02-15 LAB — TESTOST SERPL-MCNC: 954 NG/DL (ref 240–950)

## 2019-03-05 ENCOUNTER — TELEPHONE (OUTPATIENT)
Dept: PEDIATRICS | Facility: CLINIC | Age: 39
End: 2019-03-05

## 2019-03-05 DIAGNOSIS — G56.03 BILATERAL CARPAL TUNNEL SYNDROME: Primary | ICD-10-CM

## 2019-03-05 NOTE — TELEPHONE ENCOUNTER
Reason for Call:  Other prescription    Detailed comments: meloxicam, pt was prescribed the medication 2/13 and it is not helping the pain that he has. He would like something else, or would a stronger dosage work better. Hands are still numb, and the elbow is still numb.  Please call the pt back.    Send script to [facility] in [place].    Phone Number Patient can be reached at: Home number on file [phone] (home)    Best Time: any    Can we leave a detailed message on this number? YES    Call taken on 3/5/2019 at 12:15 PM by Clementine Monique

## 2019-03-05 NOTE — TELEPHONE ENCOUNTER
Pt called, reached .  Since his sx persist, I recommend an orthopedic evaluation.  Referral to [facility] placed, contact information left.   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

## 2019-03-06 ENCOUNTER — TRANSFERRED RECORDS (OUTPATIENT)
Dept: HEALTH INFORMATION MANAGEMENT | Facility: CLINIC | Age: 39
End: 2019-03-06

## 2019-10-09 ENCOUNTER — TELEPHONE (OUTPATIENT)
Dept: PEDIATRICS | Facility: CLINIC | Age: 39
End: 2019-10-09

## 2019-10-09 DIAGNOSIS — E29.1 TESTICULAR HYPOFUNCTION: ICD-10-CM

## 2019-10-09 DIAGNOSIS — E29.1 HYPOGONADISM MALE: Primary | ICD-10-CM

## 2019-10-09 NOTE — TELEPHONE ENCOUNTER
Reason for Call:  Other call back    Detailed comments: The pt was calling and he would like lab orders placed for him to get his testosterone rechecked. He would like the orders placed tomorrow. He travels and the only day he is able to come in is Thursdays.     Phone Number Patient can be reached at: Home number on file [phone] (home)    Best Time: Anytime    Can we leave a detailed message on this number? YES    Call taken on 10/9/2019 at 5:41 PM by Raiza Nelson

## 2019-11-06 ENCOUNTER — HEALTH MAINTENANCE LETTER (OUTPATIENT)
Age: 39
End: 2019-11-06

## 2019-12-09 DIAGNOSIS — E29.1 HYPOGONADISM MALE: ICD-10-CM

## 2019-12-11 RX ORDER — TESTOSTERONE CYPIONATE 200 MG/ML
INJECTION, SOLUTION INTRAMUSCULAR
Qty: 30 ML | Refills: 0 | Status: SHIPPED | OUTPATIENT
Start: 2019-12-11 | End: 2022-08-23

## 2019-12-11 NOTE — TELEPHONE ENCOUNTER
Dr. Mckay-please advise regarding refill request. Thank you!      Routing refill request to provider for review/approval because:  Labs not current:  Hematocrit, Lipids, PSA, AST  Overdue BP check, refill requires provider review per protocol  Patient needs to be seen because:  Last OV was 02/13/19    testosterone cypionate (DEPOTESTOSTERONE) 200 MG/ML injection  Last Written Prescription Date:  02/13/19  Last Fill Quantity: 30ml,  # refills: 0   Last office visit: 2/13/2019 with prescribing provider:  Meek Mckay   Future Office Visit:  None currently scheduled    Requested Prescriptions   Pending Prescriptions Disp Refills     testosterone cypionate (DEPOTESTOSTERONE) 200 MG/ML injection [Pharmacy Med Name: TESTOSTERONE CYP 200MG/ML INJ, 10ML] 30 mL 0     Sig: INJECT 3 ML IN THE MUSCLE EVERY 10 DAYS       Androgen Agents Failed - 12/9/2019  3:23 AM        Failed - Lipid panel on file in past 12 mos     Recent Labs   Lab Test 08/23/16  1123   CHOL 169   TRIG 93   HDL 33*   *   NHDL 136*               Failed - HCT less than 54% on file within past 12 mos     Recent Labs   Lab Test 12/20/16  1619   HCT 46.2             Failed - Serum PSA on file within past 12 mos     Lab Results   Component Value Date    PSA 1.66 11/17/2016             Failed - Refills for this classification require provider review        Failed - Blood pressure under 140/90 in past 6 months     BP Readings from Last 3 Encounters:   02/13/19 116/60   09/14/18 104/64   02/28/17 120/80                 Failed - Recent (6 mo) or future (30 days) visit within the authorizing provider's specialty        Failed - AST on file within past 12 mos     Recent Labs   Lab Test 09/14/18  0913   AST 26             Passed - Patient is of age 12 and older        Passed - ALT on file within past 12 mos     Recent Labs   Lab Test 02/13/19  1449   ALT 39             Passed - Medication is active on med list        Passed - Serum Testosterone on file within  past 12 mos     Recent Labs   Lab Test 02/13/19  1449   TESTOSTTOTAL 954*             Passed - Patient is not pregnant        Passed - No positive pregnancy test on file within past 12 mos        Amarilis Navarro RN on 12/11/2019 at 9:25 AM

## 2020-11-29 ENCOUNTER — HEALTH MAINTENANCE LETTER (OUTPATIENT)
Age: 40
End: 2020-11-29

## 2021-04-16 ENCOUNTER — OFFICE VISIT (OUTPATIENT)
Dept: ORTHOPEDICS | Facility: CLINIC | Age: 41
End: 2021-04-16
Payer: COMMERCIAL

## 2021-04-16 VITALS
DIASTOLIC BLOOD PRESSURE: 74 MMHG | WEIGHT: 178.6 LBS | HEIGHT: 68 IN | SYSTOLIC BLOOD PRESSURE: 126 MMHG | BODY MASS INDEX: 27.07 KG/M2

## 2021-04-16 DIAGNOSIS — G56.03 CARPAL TUNNEL SYNDROME, BILATERAL: Primary | ICD-10-CM

## 2021-04-16 PROCEDURE — 99203 OFFICE O/P NEW LOW 30 MIN: CPT | Mod: 25 | Performed by: ORTHOPAEDIC SURGERY

## 2021-04-16 PROCEDURE — 20526 THER INJECTION CARP TUNNEL: CPT | Mod: 50 | Performed by: ORTHOPAEDIC SURGERY

## 2021-04-16 RX ORDER — LIDOCAINE HYDROCHLORIDE 10 MG/ML
1 INJECTION, SOLUTION INFILTRATION; PERINEURAL
Status: DISCONTINUED | OUTPATIENT
Start: 2021-04-16 | End: 2021-09-30

## 2021-04-16 RX ORDER — TESTOSTERONE CYPIONATE 200 MG/ML
2 INJECTION INTRAMUSCULAR
Status: DISCONTINUED | OUTPATIENT
Start: 2021-04-16 | End: 2021-09-30

## 2021-04-16 RX ADMIN — TESTOSTERONE CYPIONATE 2 ML: 200 INJECTION INTRAMUSCULAR at 09:02

## 2021-04-16 RX ADMIN — LIDOCAINE HYDROCHLORIDE 1 ML: 10 INJECTION, SOLUTION INFILTRATION; PERINEURAL at 09:02

## 2021-04-16 ASSESSMENT — MIFFLIN-ST. JEOR: SCORE: 1674.86

## 2021-04-16 NOTE — PROGRESS NOTES
HISTORY OF PRESENT ILLNESS:    Emmanuel Gardner is a 41 year old male who is seen in consultation at the request of Dr. Mckay for bilateral carpal tunnel syndrome.  Onset of symptoms years. Patient is right hand dominant, and working as .     Present symptoms: nighttime numbness and tingling of bilateral hands. He reports symptoms will wake him at nightitme, but improves with massage and repositioning.  He notes sensation of weakness in his hands when he wakes, but improves with use.   No history of diabetes.   Treatments tried to this point: nighttime bracing- uncomfortable to sleep with.   Orthopedic PMH: none     Past Medical History:   Diagnosis Date     Low testosterone        History reviewed. No pertinent surgical history.    Family History   Problem Relation Age of Onset     Family History Negative Father      Unknown/Adopted Father      Family History Negative Mother      Unknown/Adopted Mother        Social History     Socioeconomic History     Marital status:      Spouse name: Not on file     Number of children: Not on file     Years of education: Not on file     Highest education level: Not on file   Occupational History     Not on file   Social Needs     Financial resource strain: Not on file     Food insecurity     Worry: Not on file     Inability: Not on file     Transportation needs     Medical: Not on file     Non-medical: Not on file   Tobacco Use     Smoking status: Never Smoker     Smokeless tobacco: Never Used   Substance and Sexual Activity     Alcohol use: No     Alcohol/week: 0.0 standard drinks     Drug use: No     Sexual activity: Never     Partners: Female   Lifestyle     Physical activity     Days per week: Not on file     Minutes per session: Not on file     Stress: Not on file   Relationships     Social connections     Talks on phone: Not on file     Gets together: Not on file     Attends Yazidi service: Not on file     Active member of club or organization: Not  "on file     Attends meetings of clubs or organizations: Not on file     Relationship status: Not on file     Intimate partner violence     Fear of current or ex partner: Not on file     Emotionally abused: Not on file     Physically abused: Not on file     Forced sexual activity: Not on file   Other Topics Concern     Parent/sibling w/ CABG, MI or angioplasty before 65F 55M? No   Social History Narrative     Not on file       Current Outpatient Medications   Medication Sig Dispense Refill     amLODIPine (NORVASC) 10 MG tablet Take 1 tablet (10 mg) by mouth daily 90 tablet 3     cholecalciferol (VITAMIN D3) 22534 UNITS capsule Take 1 capsule (50,000 Units) by mouth once a week 12 capsule prn     lisinopril (PRINIVIL/ZESTRIL) 40 MG tablet TK 1 T PO QD  4     order for DME Equipment being ordered: [employer] wrist Right, Quckfit wrist Left 2 each 0     ranitidine (ZANTAC) 300 MG tablet Take 1 tablet (300 mg) by mouth At Bedtime 90 tablet 3     SAFETY-BRETT SYRINGE 22G X 1-1/2\" 3 ML MISC   0     testosterone cypionate (DEPOTESTOSTERONE) 200 MG/ML injection INJECT 3 ML IN THE MUSCLE EVERY 10 DAYS. Office visit needed prior to additional refills. 30 mL 0     amitriptyline (ELAVIL) 25 MG tablet Take 1 tablet (25 mg) by mouth nightly as needed for sleep or pain 30 tablet 5     clomiPHENE (CLOMID) 50 MG tablet Take 0.5 tablets (25 mg) by mouth daily (Patient not taking: Reported on 2/13/2019) 15 tablet 5     cyclobenzaprine (FLEXERIL) 10 MG tablet TAKE ONE TABLET BY MOUTH NIGHTLY AS NEEDED FOR MUSCLE SPASMS (Patient not taking: Reported on 2/13/2019) 60 tablet 3       Allergies   Allergen Reactions     Hydrocodone Itching       REVIEW OF SYSTEMS:  CONSTITUTIONAL:  NEGATIVE for fever, chills, change in weight  INTEGUMENTARY/SKIN:  NEGATIVE for worrisome rashes, moles or lesions  EYES:  NEGATIVE for vision changes or irritation  ENT/MOUTH:  NEGATIVE for ear, mouth and throat problems  RESP:  NEGATIVE for significant cough or " "SOB  BREAST:  NEGATIVE for masses, tenderness or discharge  CV:  NEGATIVE for chest pain, palpitations or peripheral edema  GI:  NEGATIVE for nausea, abdominal pain, heartburn, or change in bowel habits  :  Negative   MUSCULOSKELETAL:  See HPI above  NEURO:  NEGATIVE for weakness, dizziness or paresthesias  ENDOCRINE:  NEGATIVE for temperature intolerance, skin/hair changes  HEME/ALLERGY/IMMUNE:  NEGATIVE for bleeding problems  PSYCHIATRIC:  NEGATIVE for changes in mood or affect      PHYSICAL EXAM:  /74 (BP Location: Right arm, Patient Position: Chair, Cuff Size: Adult Regular)   Ht 1.72 m (5' 7.7\")   Wt 81 kg (178 lb 9.6 oz)   BMI 27.40 kg/m    Body mass index is 27.4 kg/m .   GENERAL APPEARANCE: healthy, alert and no distress   HEENT: No apparent thyroid megaly. Clear sclera with normal ocular movement  RESPIRATORY: No labored breathing  SKIN: no suspicious lesions or rashes  NEURO: Normal strength and tone, mentation intact and speech normal  VASCULAR: Good pulses, and capillary refill   LYMPH: no lymphadenopathy   PSYCH:  mentation appears normal and affect normal/bright    MUSCULOSKELETAL:    Not in acute distress  Normal gait  Normal shoulder movement  Normal neck movement  Normal elbow movement  Full wrist movement  Full range of movement  No focal atrophy in the hands  Decreased light touch sensation of the thumb, index and long fingers  Negative Tinel signs at the palms  Positive Phalen test, bilateral  Pinching and  strength is within normal limits  Abduction and adduction strength is full, bilateral    Negative Tinel signs at the ulnar groove  No catching or locking with a finger movement  No A1 pulley tenderness    ASSESSMENT:  Bilateral carpal tunnel syndrome, chronic with a significant nocturnal symptoms    PLAN:  We had a long discussion about the nature of carpal tunnel syndrome.  Treatment options of bracing, cord injection and surgical intervention were discussed.  At this point, it " is not in good position to consider surgery.  He wants to try cortisone injections.  Potential risks were informed.  He tolerated injections to each wrist well.    If he chooses to have surgical intervention in the future, I will do 1 hand at a time.  We anticipate about 2 weeks off from his work after the surgery.  All the questions were answered.  Information materials provided today.    Follow-up as needed.    Imaging Interpretation:   None taken today.     Hand / Upper Extremity Injection/Arthrocentesis: bilateral carpal tunnel    Date/Time: 4/16/2021 9:02 AM  Performed by: Alex Kevin MD  Authorized by: Alex Kevin MD     Indications:  Diagnostic  Needle Size:  22 G  Guidance: landmark    Approach:  Volar  Condition: carpal tunnel    Laterality:  Bilateral    Site:  Bilateral carpal tunnel  Medications (Right):  2 mL dexamethasone 120 MG/30ML; 1 mL lidocaine 1 %  Medications (Left):  2 mL dexamethasone 120 MG/30ML; 1 mL lidocaine 1 %  Outcome:  Tolerated well, no immediate complications  Procedure discussed: discussed risks, benefits, and alternatives    Consent Given by:  Patient  Timeout: timeout called immediately prior to procedure          Alex Kevin MD  Department of Orthopedic Surgery        Disclaimer: This note consists of symbols derived from keyboarding, dictation and/or voice recognition software. As a result, there may be errors in the script that have gone undetected. Please consider this when interpreting information found in this chart.

## 2021-04-16 NOTE — PATIENT INSTRUCTIONS
Patient Education     Understanding Carpal Tunnel Syndrome    The carpal tunnel is a narrow space inside the wrist. It is ringed by bone and a band of tough tissue called the transverse carpal ligament. A major nerve called the median nerve runs from the forearm into the hand through the carpal tunnel. Tendons also run through the carpal tunnel.  With carpal tunnel syndrome, the tendons or nearby tissues within the carpal tunnel may swell or thicken. Or the transverse carpal ligament may harden and shorten. This narrows the space in the carpal tunnel and puts pressure on the median nerve. This pressure leads to tingling and numbness of the hand and wrist. In time, the condition can make even simple tasks hard to do.  What causes carpal tunnel syndrome?  Doctors aren t entirely clear why the condition occurs. Certain things may make a person more likely to have it. These include:    Being female    Being pregnant    Being overweight    Having diabetes or rheumatoid arthritis  Symptoms of carpal tunnel syndrome  Symptoms often come and go. At first, symptoms may occur mainly at night. Later, they may be noticed during the day as well. They may get worse with activities such as driving, reading, typing, or holding a phone. Symptoms can include:    Tingling and numbness in the hand or wrist    Sharp pain that shoots up the arm or down to the fingers    Hand stiffness or cramping, especially in the morning    Trouble making a fist    Hand weakness and clumsiness  Treatment for carpal tunnel syndrome  Certain treatments help reduce the pressure on the median nerve and relieve symptoms. Choices for treatment may include one or more of the following:    Wrist splint. This involves wearing a special brace on the wrist and hand. The splint holds the wrist straight, in a neutral position. This helps keep the carpal tunnel as open as possible.    Cortisone shots. Cortisone is a medicine that helps reduce swelling. It is  injected directly into the wrist. It helps shrink tissues inside the carpal tunnel. This relieves symptoms for a time.    Pain medicines. You may take over-the-counter or prescription medicines to help reduce swelling and relieve symptoms.    Surgery. If the condition doesn t respond to other treatments and doesn t go away on its own, you may need surgery. During surgery, the surgeon cuts the transverse carpal ligament to relieve pressure on the median nerve.     When to call your healthcare provider  Call your healthcare provider right away if you have any of these:    Fever of 100.4 F (38 C) or higher, or as directed    Symptoms that don t get better, or get worse    New symptoms   [employer] last reviewed this educational content on 3/10/2016    8054-1200 [employer]. All rights reserved. This information is not intended as a substitute for professional medical care. Always follow your healthcare professional's instructions.           Patient Education     Carpal Tunnel Release Surgery  Surgery may be done if your carpal tunnel syndrome (CTS) symptoms become severe. Or you may have surgery if no other treatment eases your symptoms. There are 2 types of CTS surgical procedures. You will be told about the one you will have. You ll also be instructed on how to prepare for it.      The goals of surgery  Two types of surgery are used to treat CTS: open and endoscopic.    Open surgery. With open surgery, your surgeon makes 1 cut (incision) in your palm. Standard surgical tools are used.    Endoscopic surgery. For this surgery, 1 or 2 small incisions are made in your hand or wrist. A thin tube with a very small camera attached (endoscope) is inserted under the carpal ligament. Tiny tools are inserted there as well. The surgeon then operates while watching images on a video screen. Each procedure has the same goal: Your surgeon will relieve pressure on the median nerve. To do this, the transverse carpal  ligament is cut (released).  After surgery  If you ve had carpal tunnel surgery, you will spend a few hours resting before you go home. The nerve sensation and blood circulation in your hand will be checked at this time. For the safest healing, do the following:    Keep your hand raised above heart level. This will help reduce swelling.    Limit hand and wrist use as instructed. You may need a wrist brace.    Take any pain medicine as directed.    Do hand exercises as directed by your surgeon or therapist.  When to call the surgeon  Call your surgeon if you notice any of the following:    White or pale-blue hand or nails (you pinch your skin or nail and the color doesn t return)    Pain that is not relieved by prescribed medicine    Loss of sensation or excess swelling in hand or fingers    Pus-like liquid oozing out of your wound    Fever over 100.4 F (38 C)  [employer] last reviewed this educational content on 1/1/2018 2000-2021 [employer]. All rights reserved. This information is not intended as a substitute for professional medical care. Always follow your healthcare professional's instructions.

## 2021-04-16 NOTE — LETTER
4/16/2021         RE: Emmanuel Gardner  [address]  [facility] [address]        Dear Colleague,    Thank you for referring your patient, Emmanuel Gardner, to the [facility] [place]. Please see a copy of my visit note below.    HISTORY OF PRESENT ILLNESS:    Emmanuel Gardner is a 41 year old male who is seen in consultation at the request of Dr. Mckay for bilateral carpal tunnel syndrome.  Onset of symptoms years. Patient is right hand dominant, and working as .     Present symptoms: nighttime numbness and tingling of bilateral hands. He reports symptoms will wake him at nightitme, but improves with massage and repositioning.  He notes sensation of weakness in his hands when he wakes, but improves with use.   No history of diabetes.   Treatments tried to this point: nighttime bracing- uncomfortable to sleep with.   Orthopedic PMH: none     Past Medical History:   Diagnosis Date     Low testosterone        History reviewed. No pertinent surgical history.    Family History   Problem Relation Age of Onset     Family History Negative Father      Unknown/Adopted Father      Family History Negative Mother      Unknown/Adopted Mother        Social History     Socioeconomic History     Marital status:      Spouse name: Not on file     Number of children: Not on file     Years of education: Not on file     Highest education level: Not on file   Occupational History     Not on file   Social Needs     Financial resource strain: Not on file     Food insecurity     Worry: Not on file     Inability: Not on file     Transportation needs     Medical: Not on file     Non-medical: Not on file   Tobacco Use     Smoking status: Never Smoker     Smokeless tobacco: Never Used   Substance and Sexual Activity     Alcohol use: No     Alcohol/week: 0.0 standard drinks     Drug use: No     Sexual activity: Never     Partners: Female   Lifestyle     Physical activity     Days per week: Not  "on file     Minutes per session: Not on file     Stress: Not on file   Relationships     Social connections     Talks on phone: Not on file     Gets together: Not on file     Attends Buddhism service: Not on file     Active member of club or organization: Not on file     Attends meetings of clubs or organizations: Not on file     Relationship status: Not on file     Intimate partner violence     Fear of current or ex partner: Not on file     Emotionally abused: Not on file     Physically abused: Not on file     Forced sexual activity: Not on file   Other Topics Concern     Parent/sibling w/ CABG, MI or angioplasty before 65F 55M? No   Social History Narrative     Not on file       Current Outpatient Medications   Medication Sig Dispense Refill     amLODIPine (NORVASC) 10 MG tablet Take 1 tablet (10 mg) by mouth daily 90 tablet 3     cholecalciferol (VITAMIN D3) 97044 UNITS capsule Take 1 capsule (50,000 Units) by mouth once a week 12 capsule prn     lisinopril (PRINIVIL/ZESTRIL) 40 MG tablet TK 1 T PO QD  4     order for DME Equipment being ordered: Qucikfit wrist Right, Quckfit wrist Left 2 each 0     ranitidine (ZANTAC) 300 MG tablet Take 1 tablet (300 mg) by mouth At Bedtime 90 tablet 3     SAFETY-BRETT SYRINGE 22G X 1-1/2\" 3 ML MISC   0     testosterone cypionate (DEPOTESTOSTERONE) 200 MG/ML injection INJECT 3 ML IN THE MUSCLE EVERY 10 DAYS. Office visit needed prior to additional refills. 30 mL 0     amitriptyline (ELAVIL) 25 MG tablet Take 1 tablet (25 mg) by mouth nightly as needed for sleep or pain 30 tablet 5     clomiPHENE (CLOMID) 50 MG tablet Take 0.5 tablets (25 mg) by mouth daily (Patient not taking: Reported on 2/13/2019) 15 tablet 5     cyclobenzaprine (FLEXERIL) 10 MG tablet TAKE ONE TABLET BY MOUTH NIGHTLY AS NEEDED FOR MUSCLE SPASMS (Patient not taking: Reported on 2/13/2019) 60 tablet 3       Allergies   Allergen Reactions     Hydrocodone Itching       REVIEW OF SYSTEMS:  CONSTITUTIONAL:  NEGATIVE " "for fever, chills, change in weight  INTEGUMENTARY/SKIN:  NEGATIVE for worrisome rashes, moles or lesions  EYES:  NEGATIVE for vision changes or irritation  ENT/MOUTH:  NEGATIVE for ear, mouth and throat problems  RESP:  NEGATIVE for significant cough or SOB  BREAST:  NEGATIVE for masses, tenderness or discharge  CV:  NEGATIVE for chest pain, palpitations or peripheral edema  GI:  NEGATIVE for nausea, abdominal pain, heartburn, or change in bowel habits  :  Negative   MUSCULOSKELETAL:  See HPI above  NEURO:  NEGATIVE for weakness, dizziness or paresthesias  ENDOCRINE:  NEGATIVE for temperature intolerance, skin/hair changes  HEME/ALLERGY/IMMUNE:  NEGATIVE for bleeding problems  PSYCHIATRIC:  NEGATIVE for changes in mood or affect      PHYSICAL EXAM:  /74 (BP Location: Right arm, Patient Position: Chair, Cuff Size: Adult Regular)   Ht 1.72 m (5' 7.7\")   Wt 81 kg (178 lb 9.6 oz)   BMI 27.40 kg/m    Body mass index is 27.4 kg/m .   GENERAL APPEARANCE: healthy, alert and no distress   HEENT: No apparent thyroid megaly. Clear sclera with normal ocular movement  RESPIRATORY: No labored breathing  SKIN: no suspicious lesions or rashes  NEURO: Normal strength and tone, mentation intact and speech normal  VASCULAR: Good pulses, and capillary refill   LYMPH: no lymphadenopathy   PSYCH:  mentation appears normal and affect normal/bright    MUSCULOSKELETAL:    Not in acute distress  Normal gait  Normal shoulder movement  Normal neck movement  Normal elbow movement  Full wrist movement  Full range of movement  No focal atrophy in the hands  Decreased light touch sensation of the thumb, index and long fingers  Negative Tinel signs at the palms  Positive Phalen test, bilateral  Pinching and  strength is within normal limits  Abduction and adduction strength is full, bilateral    Negative Tinel signs at the ulnar groove  No catching or locking with a finger movement  No A1 pulley tenderness    ASSESSMENT:  Bilateral " carpal tunnel syndrome, chronic with a significant nocturnal symptoms    PLAN:  We had a long discussion about the nature of carpal tunnel syndrome.  Treatment options of bracing, cord injection and surgical intervention were discussed.  At this point, it is not in good position to consider surgery.  He wants to try cortisone injections.  Potential risks were informed.  He tolerated injections to each wrist well.    If he chooses to have surgical intervention in the future, I will do 1 hand at a time.  We anticipate about 2 weeks off from his work after the surgery.  All the questions were answered.  Information materials provided today.    Follow-up as needed.    Imaging Interpretation:   None taken today.     Hand / Upper Extremity Injection/Arthrocentesis: bilateral carpal tunnel    Date/Time: 4/16/2021 9:02 AM  Performed by: Alex Kevin MD  Authorized by: Alex Kevin MD     Indications:  Diagnostic  Needle Size:  22 G  Guidance: landmark    Approach:  Volar  Condition: carpal tunnel    Laterality:  Bilateral    Site:  Bilateral carpal tunnel  Medications (Right):  2 mL dexamethasone 120 MG/30ML; 1 mL lidocaine 1 %  Medications (Left):  2 mL dexamethasone 120 MG/30ML; 1 mL lidocaine 1 %  Outcome:  Tolerated well, no immediate complications  Procedure discussed: discussed risks, benefits, and alternatives    Consent Given by:  Patient  Timeout: timeout called immediately prior to procedure          Alex Kevin MD  Department of Orthopedic Surgery        Disclaimer: This note consists of symbols derived from keyboarding, dictation and/or voice recognition software. As a result, there may be errors in the script that have gone undetected. Please consider this when interpreting information found in this chart.        Again, thank you for allowing me to participate in the care of your patient.        Sincerely,        Alex Kevin MD

## 2021-09-19 ENCOUNTER — HEALTH MAINTENANCE LETTER (OUTPATIENT)
Age: 41
End: 2021-09-19

## 2021-09-30 ENCOUNTER — OFFICE VISIT (OUTPATIENT)
Dept: ORTHOPEDICS | Facility: CLINIC | Age: 41
End: 2021-09-30
Payer: COMMERCIAL

## 2021-09-30 VITALS
SYSTOLIC BLOOD PRESSURE: 124 MMHG | DIASTOLIC BLOOD PRESSURE: 86 MMHG | BODY MASS INDEX: 28.19 KG/M2 | WEIGHT: 186 LBS | HEIGHT: 68 IN

## 2021-09-30 DIAGNOSIS — G56.03 CARPAL TUNNEL SYNDROME, BILATERAL: Primary | ICD-10-CM

## 2021-09-30 PROCEDURE — 99213 OFFICE O/P EST LOW 20 MIN: CPT | Mod: 25 | Performed by: ORTHOPAEDIC SURGERY

## 2021-09-30 PROCEDURE — 20526 THER INJECTION CARP TUNNEL: CPT | Mod: 50 | Performed by: ORTHOPAEDIC SURGERY

## 2021-09-30 RX ORDER — TESTOSTERONE CYPIONATE 200 MG/ML
2 INJECTION INTRAMUSCULAR
Status: DISCONTINUED | OUTPATIENT
Start: 2021-09-30 | End: 2022-08-23

## 2021-09-30 RX ORDER — LIDOCAINE HYDROCHLORIDE 10 MG/ML
1 INJECTION, SOLUTION INFILTRATION; PERINEURAL
Status: DISCONTINUED | OUTPATIENT
Start: 2021-09-30 | End: 2022-08-23

## 2021-09-30 RX ADMIN — TESTOSTERONE CYPIONATE 2 ML: 200 INJECTION INTRAMUSCULAR at 10:05

## 2021-09-30 RX ADMIN — LIDOCAINE HYDROCHLORIDE 1 ML: 10 INJECTION, SOLUTION INFILTRATION; PERINEURAL at 10:05

## 2021-09-30 ASSESSMENT — MIFFLIN-ST. JEOR: SCORE: 1708.43

## 2021-09-30 NOTE — LETTER
9/30/2021         RE: Emmanuel Gardner  [address]  [place] [address]        Dear Colleague,    Thank you for referring your patient, Emmanuel Gardner, to the [facility] [place]. Please see a copy of my visit note below.    HISTORY OF PRESENT ILLNESS:    Emmanuel Gardner is a 41 year old male who is seen in follow up for bilateral carpal tunnel syndrome.  He was last seen on 4/16/21 and obtained bilateral carpal tunnel injections.  Since this visit he reports initial relief of symptoms lasting 6-8 weeks, thereafter worsening of symptoms..  Present symptoms: bilateral hand pain and numbness. Increasing discomfort at nighttime causing him to wake to shake out hands and wrists. He reports more consistent paresthesias during the day when using his cellphone. Patient notes that he moved in June, and has misplaced his wrist splints, requesting replacement splints today.   Treatments tried to this point: bilateral cortisone injections, bracing  Past Medical History: Unchanged from the visit of 4/16/21. Please refer to that note.    REVIEW OF SYSTEMS:  CONSTITUTIONAL:  NEGATIVE for fever, chills, change in weight  INTEGUMENTARY/SKIN:  NEGATIVE for worrisome rashes, moles or lesions  EYES:  NEGATIVE for vision changes or irritation  ENT/MOUTH:  NEGATIVE for ear, mouth and throat problems  RESP:  NEGATIVE for significant cough or SOB  BREAST:  NEGATIVE for masses, tenderness or discharge  CV:  NEGATIVE for chest pain, palpitations or peripheral edema  GI:  NEGATIVE for nausea, abdominal pain, heartburn, or change in bowel habits  :  Negative   MUSCULOSKELETAL:  See HPI above  NEURO: hand paresthesias  ENDOCRINE:  NEGATIVE for temperature intolerance, skin/hair changes  HEME/ALLERGY/IMMUNE:  NEGATIVE for bleeding problems  PSYCHIATRIC:  NEGATIVE for changes in mood or affect    PHYSICAL EXAM:  /86 (BP Location: Right arm, Patient Position: Chair, Cuff Size: Adult Regular)   Ht  "1.72 m (5' 7.7\")   Wt 84.4 kg (186 lb)   BMI 28.53 kg/m    Body mass index is 28.53 kg/m .   GENERAL APPEARANCE: healthy, alert and no distress   SKIN: no suspicious lesions or rashes  NEURO: Normal strength and tone, mentation intact and speech normal  VASCULAR:  good pulses, and cappillary refill   LYMPH: no lymphadenopathy   PSYCH:  mentation appears normal and affect normal/bright    MSK:  No swelling, erythema or deformities in both wrists and hands  Pain with wrist range of motion and palmar flexion and dorsiflexion  Finger range of motion is full without catching or locking  No focal A1 pulley tenderness throughout the digits  Light touch sensation is intact  Circulation is intact  Sensation is intact  Grossly intact  strength    IMAGING INTERPRETATION: None taken today      ASSESSMENT:  Chronic bilateral carpal tunnel syndrome  Temporary improvement with cortisone injections in the past      PLAN:  At this point, he is interested in exploring the idea of carpal tunnel release for permanent solution.  However, he does not have short-term disability and worried about paying bills if he has the surgery right now.  At the end of the year, he wants to sign up for the short-term disability and consider surgery early next year.    We talked about the possibility of another cortisone injection in the meantime and he wants to do that.  He tolerated the injections well to both wrists today.    He is also interested in using a wrist splint which was provided for each wrist.    Otherwise, follow-up as needed.       Hand / Upper Extremity Injection/Arthrocentesis: bilateral carpal tunnel    Date/Time: 9/30/2021 10:05 AM  Performed by: Alex Kevin MD  Authorized by: Alex Kevin MD     Indications:  Pain  Needle Size:  27 G  Guidance: landmark    Approach:  Volar  Condition: carpal tunnel    Laterality:  Bilateral    Site:  Bilateral carpal tunnel  Medications (Right):  2 mL dexamethasone 120 MG/30ML; 1 " mL lidocaine 1 %  Medications (Left):  2 mL dexamethasone 120 MG/30ML; 1 mL lidocaine 1 %  Outcome:  Tolerated well, no immediate complications  Procedure discussed: discussed risks, benefits, and alternatives    Consent Given by:  Patient  Timeout: timeout called immediately prior to procedure    Prep: patient was prepped and draped in usual sterile fashion              Alex Kevin MD  Dept. Orthopedic Surgery  [facility]       Disclaimer: This note consists of symbols derived from keyboarding, dictation and/or voice recognition software. As a result, there may be errors in the script that have gone undetected. Please consider this when interpreting information found in this chart.        Again, thank you for allowing me to participate in the care of your patient.        Sincerely,        Alex Kevin MD

## 2021-09-30 NOTE — PROGRESS NOTES
"HISTORY OF PRESENT ILLNESS:    Emmanuel Gardner is a 41 year old male who is seen in follow up for bilateral carpal tunnel syndrome.  He was last seen on 4/16/21 and obtained bilateral carpal tunnel injections.  Since this visit he reports initial relief of symptoms lasting 6-8 weeks, thereafter worsening of symptoms..  Present symptoms: bilateral hand pain and numbness. Increasing discomfort at nighttime causing him to wake to shake out hands and wrists. He reports more consistent paresthesias during the day when using his cellphone. Patient notes that he moved in June, and has misplaced his wrist splints, requesting replacement splints today.   Treatments tried to this point: bilateral cortisone injections, bracing  Past Medical History: Unchanged from the visit of 4/16/21. Please refer to that note.    REVIEW OF SYSTEMS:  CONSTITUTIONAL:  NEGATIVE for fever, chills, change in weight  INTEGUMENTARY/SKIN:  NEGATIVE for worrisome rashes, moles or lesions  EYES:  NEGATIVE for vision changes or irritation  ENT/MOUTH:  NEGATIVE for ear, mouth and throat problems  RESP:  NEGATIVE for significant cough or SOB  BREAST:  NEGATIVE for masses, tenderness or discharge  CV:  NEGATIVE for chest pain, palpitations or peripheral edema  GI:  NEGATIVE for nausea, abdominal pain, heartburn, or change in bowel habits  :  Negative   MUSCULOSKELETAL:  See HPI above  NEURO: hand paresthesias  ENDOCRINE:  NEGATIVE for temperature intolerance, skin/hair changes  HEME/ALLERGY/IMMUNE:  NEGATIVE for bleeding problems  PSYCHIATRIC:  NEGATIVE for changes in mood or affect    PHYSICAL EXAM:  /86 (BP Location: Right arm, Patient Position: Chair, Cuff Size: Adult Regular)   Ht 1.72 m (5' 7.7\")   Wt 84.4 kg (186 lb)   BMI 28.53 kg/m    Body mass index is 28.53 kg/m .   GENERAL APPEARANCE: healthy, alert and no distress   SKIN: no suspicious lesions or rashes  NEURO: Normal strength and tone, mentation intact and speech " normal  VASCULAR:  good pulses, and cappillary refill   LYMPH: no lymphadenopathy   PSYCH:  mentation appears normal and affect normal/bright    MSK:  No swelling, erythema or deformities in both wrists and hands  Pain with wrist range of motion and palmar flexion and dorsiflexion  Finger range of motion is full without catching or locking  No focal A1 pulley tenderness throughout the digits  Light touch sensation is intact  Circulation is intact  Sensation is intact  Grossly intact  strength    IMAGING INTERPRETATION: None taken today      ASSESSMENT:  Chronic bilateral carpal tunnel syndrome  Temporary improvement with cortisone injections in the past      PLAN:  At this point, he is interested in exploring the idea of carpal tunnel release for permanent solution.  However, he does not have short-term disability and worried about paying bills if he has the surgery right now.  At the end of the year, he wants to sign up for the short-term disability and consider surgery early next year.    We talked about the possibility of another cortisone injection in the meantime and he wants to do that.  He tolerated the injections well to both wrists today.    He is also interested in using a wrist splint which was provided for each wrist.    Otherwise, follow-up as needed.       Hand / Upper Extremity Injection/Arthrocentesis: bilateral carpal tunnel    Date/Time: 9/30/2021 10:05 AM  Performed by: Alex Kevin MD  Authorized by: Alex Kevin MD     Indications:  Pain  Needle Size:  27 G  Guidance: landmark    Approach:  Volar  Condition: carpal tunnel    Laterality:  Bilateral    Site:  Bilateral carpal tunnel  Medications (Right):  2 mL dexamethasone 120 MG/30ML; 1 mL lidocaine 1 %  Medications (Left):  2 mL dexamethasone 120 MG/30ML; 1 mL lidocaine 1 %  Outcome:  Tolerated well, no immediate complications  Procedure discussed: discussed risks, benefits, and alternatives    Consent Given by:  Patient  Timeout:  timeout called immediately prior to procedure    Prep: patient was prepped and draped in usual sterile fashion              Alex Kevin MD  Dept. Orthopedic Surgery  [facility]       Disclaimer: This note consists of symbols derived from keyboarding, dictation and/or voice recognition software. As a result, there may be errors in the script that have gone undetected. Please consider this when interpreting information found in this chart.

## 2021-09-30 NOTE — PATIENT INSTRUCTIONS
Steroid injection of the bilateral wrist was performed today in clinic    - Would not soak in a hot tub, bath or swimming pool for 48 hours  - Ok to shower  - Ice today and only do your normal amounts of activity  - The lidocaine (what is giving you pain relief right now) will likely stop working in 1-2 hours.  You will then have pain again, similar to before you received the injection. The corticosteroid will not start working until approximately 1-2 weeks from now.  In a small percentage of people, cortisone can cause flushing/redness in the face. This usually lasts for 1-3 days and resolves. Cool compress and Ibuprofen/Tylenol can help if this happens.      Nikolay Surgery Scheduler will contact you to assist with scheduling surgery.   You can contact her directly at [phone].   Prior to your scheduled surgery we advise scheduling with your dentist to obtain clearance for surgery, and to complete any recommended dental work prior.     FORMS:   If you are needing any forms completed relating to your upcoming procedure, please send them to our office with a completed Release of Information.   Forms will be completed AFTER your procedure. A letter can be sent to your employer prior to surgery to inform them of your anticipated time off.    Please notify our staff if you would like a letter to do so.   Forms can be faxed directly to our clinic at [phone].     DO NOT BRING FORMS ON THE DATE OF SURGERY.     MEDICATION REFILL:   Please allow 3 business days for completion of medication refills for any surgery related prescription.   Medication refills submitted on Friday, may not be addressed until the following Monday.  You may request a refill via [employer], or by calling our Nurse Triage at [phone], option 3.       Call my office with any questions or concerns, [phone].

## 2021-12-01 ENCOUNTER — TELEPHONE (OUTPATIENT)
Dept: ORTHOPEDICS | Facility: CLINIC | Age: 41
End: 2021-12-01
Payer: COMMERCIAL

## 2021-12-01 DIAGNOSIS — G56.03 BILATERAL CARPAL TUNNEL SYNDROME: Primary | ICD-10-CM

## 2021-12-01 NOTE — TELEPHONE ENCOUNTER
Patient would like to move forward with surgery for bilateral carpal tunnel.  Patient wants to do both at the same time.     Please place surgery order.       Nikolay Rutherford, Surgery Scheduler

## 2021-12-09 NOTE — TELEPHONE ENCOUNTER
Scheduled surgery    Pt is fully vaccinated, covid test not needed for patient.     Type of surgery: bilateral carpal tunnel  Location of surgery: Other: [facility]  Date and time of surgery: 1/12/22  Surgeon: Dorita  Pre-Op Appt Date: local  Post-Op Appt Date: 1/21/22   Packet sent out: Yes  Pre-cert/Authorization completed:  No  Date: 12.9.21      Nikolay Rutherford, Surgery Scheduler

## 2022-01-09 ENCOUNTER — HEALTH MAINTENANCE LETTER (OUTPATIENT)
Age: 42
End: 2022-01-09

## 2022-01-13 NOTE — TELEPHONE ENCOUNTER
Reason for call:  Patient said he was given a prescription for hydrocodone following surgery yesterday. He has allergy to this medication. He requests alternate pain med.    Home number on file [phone] (home)

## 2022-01-14 NOTE — TELEPHONE ENCOUNTER
Phone call to patient. He has not taken the Hydrocodone due to allergy. He is taking Ibuprofen 600mg without much relief.   Apologized for the error.   Recommended he can also use ice and to make sure he is elevating his hand/wrist.   He has not been icing but will do so.   Will discuss with provider and get back with him.     Please send alternative pain medication.   Patient had bilateral CTR on 1/12/22 by Dr. Kevin.     FELICITA Tate RN

## 2022-01-14 NOTE — TELEPHONE ENCOUNTER
Returned call to both numbers listed no answer.    Pt has had multiple rx for Norco and Vicodin in the past, possible he was reacting to other medication taken at the same time.    We also do not recommend taking RX pain medication for this condition more than 1-2 days following the procedure, recommend:    Elevation, loosen wraps, tylenol/nsaids, icing, decrease activities.    Will try again later.    Aidan Nielsen PA-C  [facility] - Surgery

## 2022-01-15 NOTE — TELEPHONE ENCOUNTER
Second attempt 1/15/2022    No answer both numbers listed.  See previous note.    Aidan Nielsen PA-C  [facility] - Surgery

## 2022-01-21 ENCOUNTER — OFFICE VISIT (OUTPATIENT)
Dept: ORTHOPEDICS | Facility: CLINIC | Age: 42
End: 2022-01-21

## 2022-01-21 DIAGNOSIS — Z09 POSTOP CHECK: Primary | ICD-10-CM

## 2022-01-21 PROCEDURE — 99024 POSTOP FOLLOW-UP VISIT: CPT | Performed by: ORTHOPAEDIC SURGERY

## 2022-01-21 NOTE — LETTER
1/21/2022         RE: Emmanuel Gardner  [address] [facility] [address]        Dear Colleague,    Thank you for referring your patient, Emmanuel Gardner, to the [facility] [place]. Please see a copy of my visit note below.    Subjective:  Emmanuel Gardner is a 41 year old male who is seen in f/u up for bilateral carpal tunnel release, DOS 1/12/22. Patient reports resolved carpal tunnel symptoms. He is no longer waking at nighttime due to pain, numbness or tingling. Incisions healing well.       Objective:     Bilateral hand incision sites are healing well  No drainage or erythema noted  Full finger range of motion      Imaging studies:   None taken.         Assessment:   Status post bilateral carpal tunnel releases, doing very well        Plan:   Sutures removed today in sterile fashion.   Steri strips applied.   Keep incisions clean, dry, and covered for 1 week.     Follow up as needed.        Alex Kevin MD  Dept. Orthopedic Surgery  [facility]       Disclaimer: This note consists of symbols derived from keyboarding, dictation and/or voice recognition software. As a result, there may be errors in the script that have gone undetected. Please consider this when interpreting information found in this chart.        Again, thank you for allowing me to participate in the care of your patient.        Sincerely,        Alex Kevin MD

## 2022-01-21 NOTE — PROGRESS NOTES
Subjective:  Emmanuel Gardner is a 41 year old male who is seen in f/u up for bilateral carpal tunnel release, DOS 1/12/22. Patient reports resolved carpal tunnel symptoms. He is no longer waking at nighttime due to pain, numbness or tingling. Incisions healing well.       Objective:     Bilateral hand incision sites are healing well  No drainage or erythema noted  Full finger range of motion      Imaging studies:   None taken.         Assessment:   Status post bilateral carpal tunnel releases, doing very well        Plan:   Sutures removed today in sterile fashion.   Steri strips applied.   Keep incisions clean, dry, and covered for 1 week.     Follow up as needed.        Alex Kevin MD  Dept. Orthopedic Surgery  [facility]       Disclaimer: This note consists of symbols derived from keyboarding, dictation and/or voice recognition software. As a result, there may be errors in the script that have gone undetected. Please consider this when interpreting information found in this chart.

## 2022-08-23 ENCOUNTER — VIRTUAL VISIT (OUTPATIENT)
Dept: PEDIATRICS | Facility: CLINIC | Age: 42
End: 2022-08-23
Payer: COMMERCIAL

## 2022-08-23 DIAGNOSIS — F33.41 RECURRENT MAJOR DEPRESSIVE DISORDER, IN PARTIAL REMISSION (H): Primary | ICD-10-CM

## 2022-08-23 DIAGNOSIS — R51.9 NONINTRACTABLE EPISODIC HEADACHE, UNSPECIFIED HEADACHE TYPE: ICD-10-CM

## 2022-08-23 DIAGNOSIS — E29.1 HYPOGONADISM MALE: ICD-10-CM

## 2022-08-23 PROCEDURE — 99204 OFFICE O/P NEW MOD 45 MIN: CPT | Mod: 95 | Performed by: INTERNAL MEDICINE

## 2022-08-23 RX ORDER — IBUPROFEN 600 MG/1
600 TABLET, FILM COATED ORAL EVERY 6 HOURS PRN
Qty: 90 TABLET | Refills: 1 | Status: SHIPPED | OUTPATIENT
Start: 2022-08-23

## 2022-08-23 RX ORDER — TESTOSTERONE CYPIONATE 200 MG/ML
INJECTION, SOLUTION INTRAMUSCULAR
Qty: 30 ML | Refills: 0 | Status: SHIPPED | OUTPATIENT
Start: 2022-08-23

## 2022-08-23 RX ORDER — CITALOPRAM HYDROBROMIDE 40 MG/1
40 TABLET ORAL DAILY
Qty: 90 TABLET | Refills: 0 | Status: SHIPPED | OUTPATIENT
Start: 2022-08-23 | End: 2023-04-05

## 2022-08-23 RX ORDER — BUPROPION HYDROCHLORIDE 150 MG/1
150 TABLET ORAL EVERY MORNING
Qty: 90 TABLET | Refills: 0 | Status: SHIPPED | OUTPATIENT
Start: 2022-08-23 | End: 2023-04-05

## 2022-08-23 NOTE — PROGRESS NOTES
"Emmanuel is a 42 year old who is being evaluated via a billable video visit.      Assessment & Plan       ICD-10-CM    1. Recurrent major depressive disorder, in partial remission (H)  F33.41 buPROPion (WELLBUTRIN XL) 150 MG 24 hr tablet     citalopram (CELEXA) 40 MG tablet   2. Hypogonadism male  E29.1 testosterone cypionate (DEPOTESTOSTERONE) 200 MG/ML injection     syringe/needle, disp, (SAFETY-BRETT SYRINGE) 22G X 1-1/2\" 3 ML MISC   3. Nonintractable episodic headache, unspecified headache type  R51.9 ibuprofen (ADVIL/MOTRIN) 600 MG tablet     Depression. Not well controlled.  Continue w/ citalopram 40 mg daily.  Add bupropion 150 mg daily.  If this does not provide adequate mood control, can be increased to 300 mg in 3+ weeks.    Hypogonadism. Rx for testosterone sent in. He will need labwork in 3 months.    Return in about 3 months (around 11/23/2022) for Routine Visit.    Meek Mckay MD  [facility] is a 42 year old, presenting for the following health issues:      HPI     Depression  Has been treated with Wellbutrin 300 mg daily  Changed to citalopram, starting at 10 mg and increased now to 40 mg  Has been taking this dose for the past 6 months  He joya not feel that his mood is well controlled.  Discussed med options.  He is interested in adding Wellbutrin back to his regimen.  Recommend starting at 150 mg daily at least for the first few weeks.     In the past he has developed HA w/ Wellbutrin. Ibuprofen worked well for management. He would like a prescription for 600 mg ibuprofen.    Also low testosterone.  longstanding use of testosterone injections  3 mL every 10 days  Is feeling tired, low sex drive.  He is in need of a new prescription.  Unclear if he is currently using testosterone based on PDMP review.        Objective           Vitals:  No vitals were obtained today due to virtual visit.    Physical Exam   GEN: No distress  SKIN: No visible " rashes  LUNGS: No active cough, wheezing.   PSYCH: Normal affect. Well groomed.   NEURO: No focal deficits appreciated           Video-Visit Details    Video Start Time: 1:46 PM    Type of service:  Video Visit    Video End Time:1:56 PM    Originating Location (pt. Location): Home    Distant Location (provider location):  [facility] [place]     Platform used for Video Visit: [employer]  [place]

## 2022-11-21 ENCOUNTER — HEALTH MAINTENANCE LETTER (OUTPATIENT)
Age: 42
End: 2022-11-21

## 2022-12-26 ENCOUNTER — TRANSFERRED RECORDS (OUTPATIENT)
Dept: HEALTH INFORMATION MANAGEMENT | Facility: CLINIC | Age: 42
End: 2022-12-26

## 2023-04-05 ENCOUNTER — OFFICE VISIT (OUTPATIENT)
Dept: PEDIATRICS | Facility: CLINIC | Age: 43
End: 2023-04-05
Payer: COMMERCIAL

## 2023-04-05 VITALS
HEART RATE: 90 BPM | SYSTOLIC BLOOD PRESSURE: 120 MMHG | HEIGHT: 67 IN | TEMPERATURE: 98.1 F | DIASTOLIC BLOOD PRESSURE: 60 MMHG | RESPIRATION RATE: 14 BRPM | OXYGEN SATURATION: 98 % | WEIGHT: 180 LBS | BODY MASS INDEX: 28.25 KG/M2

## 2023-04-05 DIAGNOSIS — N52.9 ERECTILE DYSFUNCTION, UNSPECIFIED ERECTILE DYSFUNCTION TYPE: ICD-10-CM

## 2023-04-05 DIAGNOSIS — M1A.09X0 IDIOPATHIC CHRONIC GOUT OF MULTIPLE SITES WITHOUT TOPHUS: ICD-10-CM

## 2023-04-05 DIAGNOSIS — K21.9 GASTROESOPHAGEAL REFLUX DISEASE WITHOUT ESOPHAGITIS: ICD-10-CM

## 2023-04-05 DIAGNOSIS — N40.1 BENIGN PROSTATIC HYPERPLASIA WITH URINARY FREQUENCY: ICD-10-CM

## 2023-04-05 DIAGNOSIS — F33.41 RECURRENT MAJOR DEPRESSIVE DISORDER, IN PARTIAL REMISSION (H): Primary | ICD-10-CM

## 2023-04-05 DIAGNOSIS — I10 BENIGN ESSENTIAL HYPERTENSION: ICD-10-CM

## 2023-04-05 DIAGNOSIS — R35.0 BENIGN PROSTATIC HYPERPLASIA WITH URINARY FREQUENCY: ICD-10-CM

## 2023-04-05 PROCEDURE — 99214 OFFICE O/P EST MOD 30 MIN: CPT | Performed by: INTERNAL MEDICINE

## 2023-04-05 RX ORDER — LOSARTAN POTASSIUM 25 MG/1
25 TABLET ORAL DAILY
Qty: 90 TABLET | Refills: 3 | Status: SHIPPED | OUTPATIENT
Start: 2023-04-05

## 2023-04-05 RX ORDER — TADALAFIL 5 MG/1
5 TABLET ORAL EVERY 24 HOURS
Qty: 90 TABLET | Refills: 3 | Status: SHIPPED | OUTPATIENT
Start: 2023-04-05

## 2023-04-05 RX ORDER — TAMSULOSIN HYDROCHLORIDE 0.4 MG/1
0.4 CAPSULE ORAL DAILY
Qty: 90 CAPSULE | Refills: 3 | Status: SHIPPED | OUTPATIENT
Start: 2023-04-05

## 2023-04-05 RX ORDER — FEBUXOSTAT 40 MG/1
40 TABLET, FILM COATED ORAL DAILY
Qty: 90 TABLET | Refills: 3 | Status: SHIPPED | OUTPATIENT
Start: 2023-04-05

## 2023-04-05 RX ORDER — TELMISARTAN 40 MG/1
40 TABLET ORAL DAILY
Qty: 90 TABLET | Refills: 3 | Status: SHIPPED | OUTPATIENT
Start: 2023-04-05

## 2023-04-05 RX ORDER — CITALOPRAM HYDROBROMIDE 40 MG/1
40 TABLET ORAL DAILY
Qty: 90 TABLET | Refills: 1 | Status: SHIPPED | OUTPATIENT
Start: 2023-04-05

## 2023-04-05 ASSESSMENT — PATIENT HEALTH QUESTIONNAIRE - PHQ9
10. IF YOU CHECKED OFF ANY PROBLEMS, HOW DIFFICULT HAVE THESE PROBLEMS MADE IT FOR YOU TO DO YOUR WORK, TAKE CARE OF THINGS AT HOME, OR GET ALONG WITH OTHER PEOPLE: SOMEWHAT DIFFICULT
SUM OF ALL RESPONSES TO PHQ QUESTIONS 1-9: 14
SUM OF ALL RESPONSES TO PHQ QUESTIONS 1-9: 14

## 2023-04-05 NOTE — PROGRESS NOTES
Assessment & Plan       ICD-10-CM    1. Recurrent major depressive disorder, in partial remission (H)  F33.41 citalopram (CELEXA) 40 MG tablet      2. Benign prostatic hyperplasia with urinary frequency  N40.1 tamsulosin (FLOMAX) 0.4 MG capsule    R35.0 tadalafil (CIALIS) 5 MG tablet      3. Benign essential hypertension  I10 losartan (COZAAR) 25 MG tablet     telmisartan (MICARDIS) 40 MG tablet      4. Idiopathic chronic gout of multiple sites without tophus  M1A.09X0 febuxostat (ULORIC) 40 MG TABS tablet      5. Gastroesophageal reflux disease without esophagitis  K21.9 omeprazole (PRILOSEC) 20 MG DR capsule      6. Erectile dysfunction, unspecified erectile dysfunction type  N52.9 tadalafil (CIALIS) 5 MG tablet        Reviewed current medications, updated to medications which are available in the [place].  Continue citalopram for mood. He feels his mood will improve now that he is back home. Recheck in 6 mo.  Flomax plus daily tadalafil for BPH sx and ED sx.  Uloric for gout management.  Change to omeprazole for GERD management.  BP is controlled.     Meek Mckay MD  [facility] [place] Benitez is a 43 year old, presenting for the following health issues:  Medication Follow-up    Depression. He feels that his depression is fairly well managed (despite a higher PHQ9 score).  No longer taking bupropion. Is taking citalopram.   PATIENT HEALTH QUESTIONNAIRE-9 (PHQ - 9)    Over the last 2 weeks, how often have you been bothered by any of the following problems?    1. Little interest or pleasure in doing things -  More than half the days   2. Feeling down, depressed, or hopeless -  Several days   3. Trouble falling or staying asleep, or sleeping too much - More than half the days   4. Feeling tired or having little energy -  Nearly every day   5. Poor appetite or overeating -  Nearly every day   6. Feeling bad about yourself - or that you are a failure or have let yourself or your family  "down -  Not at all   7. Trouble concentrating on things, such as reading the newspaper or watching television - More than half the days   8. Moving or speaking so slowly that other people could have noticed? Or the opposite - being so fidgety or restless that you have been moving around a lot more than usual Several days   9. Thoughts that you would be better off dead or of hurting  yourself in some way Not at all   Total Score: 14     Pt had lab work done and wants to go over results.   Labs [place] when outside of the country ([place]   Reviewed results, sent for abstraction.    HTN. No cardiac sx such as CP, palpitations, PND, orthopnea, DILLON or peripheral edema.  BP Readings from Last 3 Encounters:   04/05/23 120/60   09/30/21 124/86   04/16/21 126/74     Gout. Has joint pains in several areas. We reviewed meds which were prescribed in [place] and related to meds available in the [place].  Currently has been taking febuxostat which seems to help his symptoms.    GERD. Helped by PPI.    ED. Difficulty maintaining a full erection.           Objective    /60 (BP Location: Right arm, Patient Position: Sitting, Cuff Size: Adult Regular)   Pulse 90   Temp 98.1  F (36.7  C) (Temporal)   Resp 14   Ht 1.702 m (5' 7\")   Wt 81.6 kg (180 lb)   SpO2 98%   BMI 28.19 kg/m    Body mass index is 28.19 kg/m .  Physical Exam   GENERAL: healthy, alert and no distress  EYES: PERRL, EOMI  HENT: ear canals and TM's normal  NECK: no adenopathy  RESP: lungs clear to auscultation - no rales, rhonchi or wheezes  CV: regular rate and rhythm, normal S1 S2, no murmur, no peripheral edema and peripheral pulses strong  MS: no gross musculoskeletal defects noted  PSYCH: mentation appears normal, affect normal/bright                      "

## 2023-04-16 ENCOUNTER — HEALTH MAINTENANCE LETTER (OUTPATIENT)
Age: 43
End: 2023-04-16

## 2023-12-27 ENCOUNTER — MYC REFILL (OUTPATIENT)
Dept: PEDIATRICS | Facility: CLINIC | Age: 43
End: 2023-12-27
Payer: COMMERCIAL

## 2023-12-27 DIAGNOSIS — N52.9 ERECTILE DYSFUNCTION, UNSPECIFIED ERECTILE DYSFUNCTION TYPE: ICD-10-CM

## 2023-12-27 DIAGNOSIS — R35.0 BENIGN PROSTATIC HYPERPLASIA WITH URINARY FREQUENCY: ICD-10-CM

## 2023-12-27 DIAGNOSIS — N40.1 BENIGN PROSTATIC HYPERPLASIA WITH URINARY FREQUENCY: ICD-10-CM

## 2023-12-27 DIAGNOSIS — F33.41 RECURRENT MAJOR DEPRESSIVE DISORDER, IN PARTIAL REMISSION (H): ICD-10-CM

## 2023-12-27 RX ORDER — TADALAFIL 5 MG/1
5 TABLET ORAL EVERY 24 HOURS
Qty: 90 TABLET | Refills: 3 | OUTPATIENT
Start: 2023-12-27

## 2023-12-27 RX ORDER — CITALOPRAM HYDROBROMIDE 40 MG/1
40 TABLET ORAL DAILY
Qty: 90 TABLET | Refills: 1 | OUTPATIENT
Start: 2023-12-27

## 2023-12-27 RX ORDER — TAMSULOSIN HYDROCHLORIDE 0.4 MG/1
0.4 CAPSULE ORAL DAILY
Qty: 90 CAPSULE | Refills: 3 | OUTPATIENT
Start: 2023-12-27

## 2024-06-22 ENCOUNTER — HEALTH MAINTENANCE LETTER (OUTPATIENT)
Age: 44
End: 2024-06-22

## 2025-07-12 ENCOUNTER — HEALTH MAINTENANCE LETTER (OUTPATIENT)
Age: 45
End: 2025-07-12